# Patient Record
Sex: FEMALE | Race: WHITE | NOT HISPANIC OR LATINO | Employment: STUDENT | URBAN - METROPOLITAN AREA
[De-identification: names, ages, dates, MRNs, and addresses within clinical notes are randomized per-mention and may not be internally consistent; named-entity substitution may affect disease eponyms.]

---
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
## 2018-03-01 ENCOUNTER — CLINICAL SUPPORT (OUTPATIENT)
Dept: RETAIL CLINIC | Facility: CLINIC | Age: 18
End: 2018-03-01

## 2018-03-01 DIAGNOSIS — Z11.1 VISIT FOR TB SKIN TEST: Primary | ICD-10-CM

## 2018-03-01 LAB
INDURATION: 0 MM (ref 0–10)
TB SKIN TEST: NEGATIVE

## 2018-03-01 PROCEDURE — 86580 TB INTRADERMAL TEST: CPT | Performed by: NURSE PRACTITIONER

## 2018-04-17 ENCOUNTER — OFFICE VISIT (OUTPATIENT)
Dept: FAMILY MEDICINE CLINIC | Facility: CLINIC | Age: 18
End: 2018-04-17

## 2018-04-17 VITALS
WEIGHT: 209 LBS | RESPIRATION RATE: 16 BRPM | HEIGHT: 67 IN | TEMPERATURE: 97.9 F | HEART RATE: 81 BPM | OXYGEN SATURATION: 100 % | SYSTOLIC BLOOD PRESSURE: 110 MMHG | DIASTOLIC BLOOD PRESSURE: 70 MMHG | BODY MASS INDEX: 32.8 KG/M2

## 2018-04-17 DIAGNOSIS — R53.83 TIRED: Primary | ICD-10-CM

## 2018-04-17 DIAGNOSIS — Z83.2 FAMILY HISTORY OF IRON DEFICIENCY ANEMIA: ICD-10-CM

## 2018-04-17 PROCEDURE — 99213 OFFICE O/P EST LOW 20 MIN: CPT | Performed by: PHYSICIAN ASSISTANT

## 2018-04-17 RX ORDER — LORATADINE 10 MG/1
CAPSULE, LIQUID FILLED ORAL
COMMUNITY
End: 2021-02-19 | Stop reason: SDDI

## 2018-04-17 NOTE — PROGRESS NOTES
Subjective   Adriane Valentin is a 17 y.o. female.     History of Present Illness   Adriane Valentin 17 y.o. female who presents today for tired; no energy; had URI last month.  She is vegetarian.   she has a history of   Patient Active Problem List   Diagnosis   • Anxiety   • Depression   • Chronic nonintractable headache   • Weight gain   • Hyperprolactinemia   • Dizziness   • Chronic fatigue   .      Off OCP and regular periods    She sees DR Jean Coe for psych--on Lexapro and Adderall  Not falling asleep  Tired with minimal activity  Works at     If labs neg consider sleep study    Saw Dr Faye and prolactin f/u was normal  The following portions of the patient's history were reviewed and updated as appropriate: allergies, current medications, past family history, past medical history, past social history, past surgical history and problem list.    Review of Systems   Constitutional: Positive for fatigue and unexpected weight change. Negative for activity change and appetite change.   HENT: Negative for nosebleeds and trouble swallowing.    Eyes: Negative for pain and visual disturbance.   Respiratory: Negative for chest tightness, shortness of breath and wheezing.    Cardiovascular: Negative for chest pain and palpitations.   Gastrointestinal: Positive for nausea. Negative for abdominal pain and blood in stool.   Endocrine: Negative.    Genitourinary: Negative for difficulty urinating and hematuria.   Musculoskeletal: Negative for joint swelling.   Skin: Negative for color change and rash.   Allergic/Immunologic: Negative.    Neurological: Positive for dizziness. Negative for syncope and speech difficulty.   Hematological: Negative for adenopathy.   Psychiatric/Behavioral: Negative for agitation and confusion. The patient is nervous/anxious.    All other systems reviewed and are negative.      Objective   Physical Exam   Constitutional: She is oriented to person, place, and time. She appears well-developed and  well-nourished. No distress.   HENT:   Head: Normocephalic and atraumatic.   Right Ear: External ear normal.   Left Ear: External ear normal.   Nose: Nose normal.   Mouth/Throat: Oropharynx is clear and moist.   Mild cobblestoning   Eyes: Conjunctivae and EOM are normal. Pupils are equal, round, and reactive to light. Right eye exhibits no discharge. Left eye exhibits no discharge. No scleral icterus.   Neck: Normal range of motion. Neck supple. No tracheal deviation present. No thyromegaly present.   Cardiovascular: Normal rate, regular rhythm, normal heart sounds, intact distal pulses and normal pulses.  Exam reveals no gallop.    No murmur heard.  Pulmonary/Chest: Effort normal and breath sounds normal. No respiratory distress. She has no wheezes. She has no rales.   Abdominal: Soft. She exhibits no mass.   No organomeg   Musculoskeletal: Normal range of motion.   Neurological: She is alert and oriented to person, place, and time. She exhibits normal muscle tone. Coordination normal.   Skin: Skin is warm. No rash noted. No erythema. No pallor.   Psychiatric: She has a normal mood and affect. Her behavior is normal. Judgment and thought content normal.   Nursing note and vitals reviewed.      Assessment/Plan   Diagnoses and all orders for this visit:    Tired    Family history of iron deficiency anemia

## 2018-04-17 NOTE — PATIENT INSTRUCTIONS
Low glycemic index diet  Exercise 30 minutes most days of the week  Make sure you get results on any labs or tests we ordered today  We discussed medications and how to take them as prescribed  Sleep 6-8 hours each night if possible  If you have not signed up for PT PALt, please activate your code ASAP from your After Visit Summary today    LDL goal <100  LDL goal if heart disease <70  HDL goal >60  Triglyceride goal <150  BP goal =<130/80  Fasting glucose <100

## 2018-04-18 LAB
25(OH)D3+25(OH)D2 SERPL-MCNC: 42.8 NG/ML (ref 30–100)
ALBUMIN SERPL-MCNC: 4.8 G/DL (ref 3.5–5.2)
ALBUMIN/GLOB SERPL: 1.8 G/DL
ALP SERPL-CCNC: 94 U/L (ref 39–117)
ALT SERPL-CCNC: 11 U/L (ref 1–33)
AST SERPL-CCNC: 16 U/L (ref 1–32)
BASOPHILS # BLD AUTO: 0.08 10*3/MM3 (ref 0–0.2)
BASOPHILS NFR BLD AUTO: 0.7 % (ref 0–1.5)
BILIRUB SERPL-MCNC: 0.3 MG/DL (ref 0.1–1.2)
BUN SERPL-MCNC: 9 MG/DL (ref 6–20)
BUN/CREAT SERPL: 13.8 (ref 7–25)
CALCIUM SERPL-MCNC: 9.9 MG/DL (ref 8.6–10.5)
CHLORIDE SERPL-SCNC: 101 MMOL/L (ref 98–107)
CMV IGG SERPL IA-ACNC: <0.6 U/ML (ref 0–0.59)
CMV IGM SERPL IA-ACNC: <30 AU/ML (ref 0–29.9)
CO2 SERPL-SCNC: 26.9 MMOL/L (ref 22–29)
CREAT SERPL-MCNC: 0.65 MG/DL (ref 0.57–1)
EBV EA IGG SER-ACNC: <9 U/ML (ref 0–8.9)
EBV NA IGG SER IA-ACNC: <18 U/ML (ref 0–17.9)
EBV VCA IGG SER IA-ACNC: <18 U/ML (ref 0–17.9)
EBV VCA IGM SER IA-ACNC: <36 U/ML (ref 0–35.9)
EOSINOPHIL # BLD AUTO: 0.14 10*3/MM3 (ref 0–0.7)
EOSINOPHIL NFR BLD AUTO: 1.2 % (ref 0.3–6.2)
ERYTHROCYTE [DISTWIDTH] IN BLOOD BY AUTOMATED COUNT: 13 % (ref 11.7–13)
FERRITIN SERPL-MCNC: 28.85 NG/ML (ref 13–150)
FOLATE SERPL-MCNC: >20 NG/ML (ref 4.78–24.2)
GFR SERPLBLD CREATININE-BSD FMLA CKD-EPI: 117 ML/MIN/1.73
GFR SERPLBLD CREATININE-BSD FMLA CKD-EPI: 142 ML/MIN/1.73
GLOBULIN SER CALC-MCNC: 2.7 GM/DL
GLUCOSE SERPL-MCNC: 89 MG/DL (ref 65–99)
HCT VFR BLD AUTO: 42 % (ref 35.6–45.5)
HGB BLD-MCNC: 13.3 G/DL (ref 11.9–15.5)
IMM GRANULOCYTES # BLD: 0.04 10*3/MM3 (ref 0–0.03)
IMM GRANULOCYTES NFR BLD: 0.3 % (ref 0–0.5)
LYMPHOCYTES # BLD AUTO: 2.02 10*3/MM3 (ref 0.9–4.8)
LYMPHOCYTES NFR BLD AUTO: 16.7 % (ref 19.6–45.3)
MCH RBC QN AUTO: 28.7 PG (ref 26.9–32)
MCHC RBC AUTO-ENTMCNC: 31.7 G/DL (ref 32.4–36.3)
MCV RBC AUTO: 90.5 FL (ref 80.5–98.2)
MONOCYTES # BLD AUTO: 0.75 10*3/MM3 (ref 0.2–1.2)
MONOCYTES NFR BLD AUTO: 6.2 % (ref 5–12)
NEUTROPHILS # BLD AUTO: 9.08 10*3/MM3 (ref 1.9–8.1)
NEUTROPHILS NFR BLD AUTO: 74.9 % (ref 42.7–76)
PLATELET # BLD AUTO: 361 10*3/MM3 (ref 140–500)
POTASSIUM SERPL-SCNC: 4.2 MMOL/L (ref 3.5–5.2)
PROT SERPL-MCNC: 7.5 G/DL (ref 6–8.5)
RBC # BLD AUTO: 4.64 10*6/MM3 (ref 3.9–5.2)
SERVICE CMNT-IMP: NORMAL
SODIUM SERPL-SCNC: 142 MMOL/L (ref 136–145)
T3FREE SERPL-MCNC: 3.2 PG/ML (ref 2.3–5)
T4 FREE SERPL-MCNC: 1.21 NG/DL (ref 0.93–1.7)
TSH SERPL DL<=0.005 MIU/L-ACNC: 0.85 MIU/ML (ref 0.27–4.2)
VIT B12 SERPL-MCNC: 936 PG/ML (ref 211–946)
WBC # BLD AUTO: 12.11 10*3/MM3 (ref 4.5–10.7)

## 2018-04-19 DIAGNOSIS — R79.89 ABNORMAL CBC: Primary | ICD-10-CM

## 2018-04-19 RX ORDER — CEFDINIR 300 MG/1
CAPSULE ORAL
Qty: 20 CAPSULE | Refills: 0 | Status: SHIPPED | OUTPATIENT
Start: 2018-04-19 | End: 2018-04-20

## 2018-04-20 RX ORDER — AZITHROMYCIN 250 MG/1
TABLET, FILM COATED ORAL
Qty: 6 TABLET | Refills: 1 | Status: SHIPPED | OUTPATIENT
Start: 2018-04-20 | End: 2021-02-19 | Stop reason: SDDI

## 2019-06-05 ENCOUNTER — LAB (OUTPATIENT)
Dept: ENDOCRINOLOGY | Age: 19
End: 2019-06-05

## 2019-06-05 DIAGNOSIS — E22.1 HYPERPROLACTINEMIA (HCC): Primary | ICD-10-CM

## 2019-06-05 DIAGNOSIS — F32.A DEPRESSION, UNSPECIFIED DEPRESSION TYPE: ICD-10-CM

## 2019-06-05 DIAGNOSIS — F41.9 ANXIETY: ICD-10-CM

## 2019-06-05 DIAGNOSIS — R53.82 CHRONIC FATIGUE: ICD-10-CM

## 2019-06-05 DIAGNOSIS — R42 DIZZINESS: ICD-10-CM

## 2019-06-05 DIAGNOSIS — R63.5 WEIGHT GAIN: ICD-10-CM

## 2019-06-05 DIAGNOSIS — E22.1 HYPERPROLACTINEMIA (HCC): ICD-10-CM

## 2019-06-10 LAB
25(OH)D3+25(OH)D2 SERPL-MCNC: 35.3 NG/ML (ref 30–100)
ACTH PLAS-MCNC: 19.9 PG/ML (ref 7.2–63.3)
ALBUMIN SERPL-MCNC: 4.5 G/DL (ref 3.5–5.2)
ALBUMIN/GLOB SERPL: 1.6 G/DL
ALP SERPL-CCNC: 78 U/L (ref 39–117)
ALT SERPL-CCNC: 8 U/L (ref 1–33)
AST SERPL-CCNC: 12 U/L (ref 1–32)
BILIRUB SERPL-MCNC: 0.5 MG/DL (ref 0.2–1.2)
BUN SERPL-MCNC: 6 MG/DL (ref 6–20)
BUN/CREAT SERPL: 9.4 (ref 7–25)
CALCIUM SERPL-MCNC: 10 MG/DL (ref 8.6–10.5)
CHLORIDE SERPL-SCNC: 103 MMOL/L (ref 98–107)
CHOLEST SERPL-MCNC: 146 MG/DL (ref 0–200)
CO2 SERPL-SCNC: 26.2 MMOL/L (ref 22–29)
CORTIS SERPL-MCNC: 10.4 UG/DL
CREAT SERPL-MCNC: 0.64 MG/DL (ref 0.57–1)
FT4I SERPL CALC-MCNC: 1.7 (ref 1.2–4.9)
GLOBULIN SER CALC-MCNC: 2.8 GM/DL
GLUCOSE SERPL-MCNC: 92 MG/DL (ref 65–99)
HDLC SERPL-MCNC: 56 MG/DL (ref 40–60)
INTERPRETATION: NORMAL
LDLC SERPL CALC-MCNC: 83 MG/DL (ref 0–100)
POTASSIUM SERPL-SCNC: 4.2 MMOL/L (ref 3.5–5.2)
PROLACTIN SERPL-MCNC: 25.2 NG/ML (ref 4.8–23.3)
PROT SERPL-MCNC: 7.3 G/DL (ref 6–8.5)
SODIUM SERPL-SCNC: 141 MMOL/L (ref 136–145)
T3FREE SERPL-MCNC: 3.1 PG/ML (ref 2–4.4)
T3RU NFR SERPL: 25 % (ref 24–39)
T4 FREE SERPL-MCNC: 1.2 NG/DL (ref 0.93–1.7)
T4 SERPL-MCNC: 6.7 UG/DL (ref 4.5–12)
THYROGLOB AB SERPL-ACNC: 1.2 IU/ML (ref 0–0.9)
THYROGLOB SERPL-MCNC: 4.4 NG/ML
TRIGL SERPL-MCNC: 37 MG/DL (ref 0–150)
TSH SERPL DL<=0.005 MIU/L-ACNC: 1.6 UIU/ML (ref 0.45–4.5)
URATE SERPL-MCNC: 4.7 MG/DL (ref 2.4–5.7)
VLDLC SERPL CALC-MCNC: 7.4 MG/DL

## 2019-06-14 ENCOUNTER — OFFICE VISIT (OUTPATIENT)
Dept: ENDOCRINOLOGY | Age: 19
End: 2019-06-14

## 2019-06-14 VITALS
HEIGHT: 67 IN | DIASTOLIC BLOOD PRESSURE: 68 MMHG | BODY MASS INDEX: 30.61 KG/M2 | SYSTOLIC BLOOD PRESSURE: 124 MMHG | WEIGHT: 195 LBS

## 2019-06-14 DIAGNOSIS — R63.5 WEIGHT GAIN: Primary | ICD-10-CM

## 2019-06-14 DIAGNOSIS — N92.6 ABNORMAL MENSES: ICD-10-CM

## 2019-06-14 DIAGNOSIS — F32.A DEPRESSION, UNSPECIFIED DEPRESSION TYPE: ICD-10-CM

## 2019-06-14 DIAGNOSIS — R53.82 CHRONIC FATIGUE: ICD-10-CM

## 2019-06-14 PROCEDURE — 99214 OFFICE O/P EST MOD 30 MIN: CPT | Performed by: NURSE PRACTITIONER

## 2019-06-14 NOTE — PROGRESS NOTES
Subjective   Adriane Valentin is a 18 y.o. female here to be seen for hyperprolactinemia.     Hyperprolactinemia, fatigue, irrg menses,       Hyperprolactinemia   This is a chronic problem. The current episode started more than 1 year ago. The problem occurs constantly. The problem has been unchanged. Associated symptoms include fatigue and headaches. Pertinent negatives include no coughing, myalgias or rash. Nothing aggravates the symptoms. She has tried nothing for the symptoms. The treatment provided no relief.   Fatigue   This is a chronic problem. The current episode started more than 1 year ago. The problem occurs constantly. The problem has been unchanged. Associated symptoms include fatigue and headaches. Pertinent negatives include no coughing, myalgias or rash. The treatment provided no relief.       The following portions of the patient's history were reviewed and updated as appropriate: current medications, past family history, past medical history, past social history, past surgical history and problem list.       Current Outpatient Medications:   •  azithromycin (ZITHROMAX) 250 MG tablet, Take 2 tablets the first day, then 1 tablet daily for 4 days for infection, Disp: 6 tablet, Rfl: 1  •  clonazePAM (KlonoPIN) 0.5 MG tablet, Take 0.5 mg by mouth 2 (two) times a day as needed., Disp: , Rfl:   •  escitalopram (LEXAPRO) 10 MG tablet, Take 5 mg by mouth Daily., Disp: , Rfl:   •  hyoscyamine (ANASPAZ,LEVSIN) 0.125 MG tablet, One PO 20 minutes prior to meals or Q 6 PRN IBS, Disp: 60 tablet, Rfl: 2  •  Loratadine 10 MG capsule, Take  by mouth., Disp: , Rfl:   •  methylphenidate (RITALIN) 10 MG tablet, 2 (Two) Times a Day As Needed., Disp: , Rfl:   •  Ped Multivitamins-Fl-Iron (MULTIVITAMIN WITH FLUORIDE/IRON) 0.25-10 MG/ML solution solution, Take  by mouth Daily., Disp: , Rfl:   •  Norethindrone Acet-Ethinyl Est (LOESTRIN 1/20, 21, PO), Take 1 tablet by mouth Daily., Disp: , Rfl:      Patient Active Problem List    Diagnosis   • Anxiety   • Depression   • Chronic nonintractable headache   • Weight gain   • Hyperprolactinemia (CMS/HCC)   • Dizziness   • Chronic fatigue       Review of Systems   Constitutional: Positive for fatigue.   HENT: Negative for trouble swallowing.    Eyes: Positive for visual disturbance.   Respiratory: Negative for cough.    Cardiovascular: Negative for palpitations.   Gastrointestinal: Positive for constipation and diarrhea.   Endocrine: Negative for cold intolerance.   Genitourinary: Positive for menstrual problem.        Menses - OCP made them every 2 weeks, heavy - painful- now irreg within 2 days heavy for long duration   Musculoskeletal: Negative for myalgias.   Skin: Negative for rash.   Allergic/Immunologic: Negative.    Neurological: Positive for light-headedness and headaches.   Hematological: Bruises/bleeds easily.   Psychiatric/Behavioral: Positive for sleep disturbance. The patient is nervous/anxious.         Anxious all the time, sorse around menses.   Sleep - difficult going to sleep and staying asleep     Lab on 06/05/2019   Component Date Value Ref Range Status   • Uric Acid 06/05/2019 4.7  2.4 - 5.7 mg/dL Final   • Thyroglobulin Ab 06/05/2019 1.2* 0.0 - 0.9 IU/mL Final    Thyroglobulin Antibody measured by [facility] Methodology   • Thyroglobulin (TG-JUANJOSE) 06/05/2019 4.4  ng/mL Final    Comment: Reference Range:  Pubertal Children  and Adults: <40  According to the National Academy of Clinical Biochemistry,  the reference interval for Thyroglobulin (TG) should be  related to euthyroid patients and not for patients who  underwent thyroidectomy.  TG reference intervals for these  patients depend on the residual mass of the thyroid tissue  left after surgery.  Establishing a post-operative baseline  is recommended.  The assay quantitation limit is 2.0 ng/mL.     • Prolactin 06/05/2019 25.2* 4.8 - 23.3 ng/mL Final   • ACTH 06/05/2019 19.9  7.2 - 63.3 pg/mL Final    ACTH reference  interval for samples collected between 7 and 10 AM.   • Cortisol 06/05/2019 10.4  ug/dL Final    Comment:                         Cortisol AM         6.2 - 19.4                          Cortisol PM         2.3 - 11.9     • Total Cholesterol 06/05/2019 146  0 - 200 mg/dL Final   • Triglycerides 06/05/2019 37  0 - 150 mg/dL Final   • HDL Cholesterol 06/05/2019 56  40 - 60 mg/dL Final   • VLDL Cholesterol 06/05/2019 7.4  mg/dL Final   • LDL Cholesterol  06/05/2019 83  0 - 100 mg/dL Final   • TSH 06/05/2019 1.600  0.450 - 4.500 uIU/mL Final   • T4, Total 06/05/2019 6.7  4.5 - 12.0 ug/dL Final   • T3 Uptake 06/05/2019 25  24 - 39 % Final   • Free Thyroxine Index 06/05/2019 1.7  1.2 - 4.9 Final   • T3, Free 06/05/2019 3.1  2.0 - 4.4 pg/mL Final   • Free T4 06/05/2019 1.20  0.93 - 1.70 ng/dL Final   • 25 Hydroxy, Vitamin D 06/05/2019 35.3  30.0 - 100.0 ng/ml Final    Comment: Reference Range for Total Vitamin D 25(OH)  Deficiency <20.0 ng/mL  Insufficiency 21-29 ng/mL  Sufficiency  ng/mL  Toxicity >100 ng/ml     • Glucose 06/05/2019 92  65 - 99 mg/dL Final   • BUN 06/05/2019 6  6 - 20 mg/dL Final   • Creatinine 06/05/2019 0.64  0.57 - 1.00 mg/dL Final   • eGFR Non  Am 06/05/2019 118  >60 mL/min/1.73 Final   • eGFR African Am 06/05/2019 143  >60 mL/min/1.73 Final   • BUN/Creatinine Ratio 06/05/2019 9.4  7.0 - 25.0 Final   • Sodium 06/05/2019 141  136 - 145 mmol/L Final   • Potassium 06/05/2019 4.2  3.5 - 5.2 mmol/L Final   • Chloride 06/05/2019 103  98 - 107 mmol/L Final   • Total CO2 06/05/2019 26.2  22.0 - 29.0 mmol/L Final   • Calcium 06/05/2019 10.0  8.6 - 10.5 mg/dL Final   • Total Protein 06/05/2019 7.3  6.0 - 8.5 g/dL Final   • Albumin 06/05/2019 4.50  3.50 - 5.20 g/dL Final   • Globulin 06/05/2019 2.8  gm/dL Final   • A/G Ratio 06/05/2019 1.6  g/dL Final   • Total Bilirubin 06/05/2019 0.5  0.2 - 1.2 mg/dL Final   • Alkaline Phosphatase 06/05/2019 78  39 - 117 U/L Final   • AST (SGOT) 06/05/2019 12  1 -  32 U/L Final   • ALT (SGPT) 06/05/2019 8  1 - 33 U/L Final   • Interpretation 06/05/2019 Note   Final    Supplemental report is available.       Wt Readings from Last 3 Encounters:   06/14/19 88.5 kg (195 lb)   04/17/18 94.8 kg (209 lb) (98 %, Z= 2.07)*   09/22/17 91.2 kg (201 lb) (98 %, Z= 1.97)*     * Growth percentiles are based on [facility] (Girls, 2-20 Years) data.     Temp Readings from Last 3 Encounters:   04/17/18 97.9 °F (36.6 °C) (Oral)   09/21/17 98.4 °F (36.9 °C) (Oral)   08/07/17 98.7 °F (37.1 °C) (Oral)     BP Readings from Last 3 Encounters:   06/14/19 124/68   04/17/18 110/70   09/22/17 110/78     Pulse Readings from Last 3 Encounters:   04/17/18 81   09/21/17 87   08/07/17 90       Objective   Physical Exam   Constitutional: She is oriented to person, place, and time. She appears well-developed and well-nourished. No distress.   HENT:   Head: Normocephalic and atraumatic.   Eyes: EOM are normal. Pupils are equal, round, and reactive to light.   Neck: Normal range of motion. Neck supple.   Cardiovascular: Normal rate, regular rhythm, normal heart sounds and intact distal pulses.   No murmur heard.  Pulmonary/Chest: Effort normal and breath sounds normal.   Abdominal: Soft. Bowel sounds are normal.   Musculoskeletal: Normal range of motion.   Neurological: She is alert and oriented to person, place, and time.   Skin: Skin is warm and dry. Capillary refill takes 2 to 3 seconds. Rash noted. She is not diaphoretic. No pallor.   Skin bilateral axilla and posterior neck   Psychiatric: She has a normal mood and affect. Her behavior is normal. Judgment and thought content normal.   Nursing note and vitals reviewed.        Assessment/Plan   Adriane was seen today for follow-up.     in summary:    Diagnoses and all orders for this visit:    Weight gain  -     Cortisol  -     ACTH  -     Insulin, Total  -     C-Peptide  -     Uric Acid    Chronic fatigue  -     Cortisol  -     ACTH  -     Insulin, Total  -      C-Peptide  -     Uric Acid    Depression, unspecified depression type  -     Cortisol  -     ACTH  -     Insulin, Total  -     C-Peptide  -     Uric Acid    Abnormal menses  -     Cortisol        Diagnoses and all orders for this visit:    Weight gain- chronic, uncontrolled.  Medication changes were as follows  No medication changes at this time. Will assess additional lab work.      Chronic fatigue- No medication changes at this time. Will assess additional lab work.         Depression, unspecified depression type- chronic, uncontrolled.  Medication changes were as follows  None at this time.  Discussion concerning genetic testing due to multiple medications patients have's been on.  Patient took kit with her and will decide with her mother.     Abnormal menses -chronic, uncontrolled.  Medication changes were as follows  no changes at this time.  Long discussion concerning progesterone only med occasions due to the sensitivity of estrogen.      No Follow-up on file.

## 2019-06-17 LAB
ACTH PLAS-MCNC: 11.3 PG/ML (ref 7.2–63.3)
C PEPTIDE SERPL-MCNC: 3.1 NG/ML (ref 1.1–4.4)
CORTIS SERPL-MCNC: 12.7 UG/DL
INSULIN SERPL-ACNC: 11.9 UIU/ML (ref 2.6–24.9)
URATE SERPL-MCNC: 4.6 MG/DL (ref 2.4–5.7)

## 2021-02-19 ENCOUNTER — OFFICE VISIT (OUTPATIENT)
Dept: FAMILY MEDICINE CLINIC | Facility: CLINIC | Age: 21
End: 2021-02-19

## 2021-02-19 VITALS
HEART RATE: 82 BPM | WEIGHT: 171.8 LBS | RESPIRATION RATE: 16 BRPM | TEMPERATURE: 97.5 F | BODY MASS INDEX: 26.97 KG/M2 | DIASTOLIC BLOOD PRESSURE: 61 MMHG | HEIGHT: 67 IN | SYSTOLIC BLOOD PRESSURE: 100 MMHG | OXYGEN SATURATION: 99 %

## 2021-02-19 DIAGNOSIS — E22.1 HYPERPROLACTINEMIA (HCC): ICD-10-CM

## 2021-02-19 DIAGNOSIS — N76.0 BV (BACTERIAL VAGINOSIS): Primary | ICD-10-CM

## 2021-02-19 DIAGNOSIS — E61.1 LOW IRON: ICD-10-CM

## 2021-02-19 DIAGNOSIS — R53.82 CHRONIC FATIGUE: ICD-10-CM

## 2021-02-19 DIAGNOSIS — B96.89 BV (BACTERIAL VAGINOSIS): Primary | ICD-10-CM

## 2021-02-19 DIAGNOSIS — R79.89 PROLACTIN INCREASED: ICD-10-CM

## 2021-02-19 DIAGNOSIS — Z00.00 LABORATORY EXAMINATION ORDERED AS PART OF A ROUTINE GENERAL MEDICAL EXAMINATION: ICD-10-CM

## 2021-02-19 DIAGNOSIS — E55.9 VITAMIN D DEFICIENCY: ICD-10-CM

## 2021-02-19 PROCEDURE — 99213 OFFICE O/P EST LOW 20 MIN: CPT | Performed by: PHYSICIAN ASSISTANT

## 2021-02-19 RX ORDER — FLUTICASONE PROPIONATE 50 MCG
SPRAY, SUSPENSION (ML) NASAL
Qty: 48 G | Refills: 3 | Status: SHIPPED | OUTPATIENT
Start: 2021-02-19 | End: 2021-06-17

## 2021-02-19 RX ORDER — METRONIDAZOLE 7.5 MG/G
GEL VAGINAL
Qty: 70 G | Refills: 0 | Status: SHIPPED | OUTPATIENT
Start: 2021-02-19 | End: 2021-06-17

## 2021-02-19 NOTE — PATIENT INSTRUCTIONS
Allergies, Adult  An allergy is when your body's defense system (immune system) overreacts to an otherwise harmless substance (allergen) that you breathe in or eat or something that touches your skin. When you come into contact with something that you are allergic to, your immune system produces certain proteins (antibodies). These proteins cause cells to release chemicals (histamines) that trigger the symptoms of an allergic reaction.  Allergies often affect the nasal passages (allergic rhinitis), eyes (allergic conjunctivitis), skin (atopic dermatitis), and stomach. Allergies can be mild or severe. Allergies cannot spread from person to person (are not contagious). They can develop at any age and may be outgrown.  What increases the risk?  You may be at greater risk of allergies if other people in your family have allergies.  What are the signs or symptoms?  Symptoms depend on what type of allergy you have. They may include:  · Runny, stuffy nose.  · Sneezing.  · Itchy mouth, ears, or throat.  · Postnasal drip.  · Sore throat.  · Itchy, red, watery, or puffy eyes.  · Skin rash or hives.  · Stomach pain.  · Vomiting.  · Diarrhea.  · Bloating.  · Wheezing or coughing.  People with a severe allergy to food, medicine, or an insect bite may have a life-threatening allergic reaction (anaphylaxis). Symptoms of anaphylaxis include:  · Hives.  · Itching.  · Flushed face.  · Swollen lips, tongue, or mouth.  · Tight or swollen throat.  · Chest pain or tightness in the chest.  · Trouble breathing or shortness of breath.  · Rapid heartbeat.  · Dizziness or fainting.  · Vomiting.  · Diarrhea.  · Pain in the abdomen.  How is this diagnosed?  This condition is diagnosed based on:  · Your symptoms.  · Your family and medical history.  · A physical exam.  You may need to see a health care provider who specializes in treating allergies (allergist). You may also have tests, including:  · Skin tests to see which allergens are causing  your symptoms, such as:  ? Skin prick test. In this test, your skin is pricked with a tiny needle and exposed to small amounts of possible allergens to see if your skin reacts.  ? Intradermal skin test. In this test, a small amount of allergen is injected under your skin to see if your skin reacts.  ? Patch test. In this test, a small amount of allergen is placed on your skin and then your skin is covered with a bandage. Your health care provider will check your skin after a couple of days to see if a rash has developed.  · Blood tests.  · Challenges tests. In this test, you inhale a small amount of allergen by mouth to see if you have an allergic reaction.  You may also be asked to:  · Keep a food diary. A food diary is a record of all the foods and drinks you have in a day and any symptoms you experience.  · Practice an elimination diet. An elimination diet involves eliminating specific foods from your diet and then adding them back in one by one to find out if a certain food causes an allergic reaction.  How is this treated?  Treatment for allergies depends on your symptoms. Treatment may include:  · Cold compresses to soothe itching and swelling.  · Eye drops.  · Nasal sprays.  · Using a saline spray or container (neti pot) to flush out the nose (nasal irrigation). These methods can help clear away mucus and keep the nasal passages moist.  · Using a humidifier.  · Oral antihistamines or other medicines to block allergic reaction and inflammation.  · Skin creams to treat rashes or itching.  · Diet changes to eliminate food allergy triggers.  · Repeated exposure to tiny amounts of allergens to build up a tolerance and prevent future allergic reactions (immunotherapy). These include:  ? Allergy shots.  ? Oral treatment. This involves taking small doses of an allergen under the tongue (sublingual immunotherapy).  · Emergency epinephrine injection (auto-injector) in case of an allergic emergency. This is a  self-injectable, pre-measured medicine that must be given within the first few minutes of a serious allergic reaction.  Follow these instructions at home:              · Avoid known allergens whenever possible.  · If you suffer from airborne allergens, wash out your nose daily. You can do this with a saline spray or a neti pot to flush out your nose (nasal irrigation).  · Take over-the-counter and prescription medicines only as told by your health care provider.  · Keep all follow-up visits as told by your health care provider. This is important.  · If you are at risk of a severe allergic reaction (anaphylaxis), keep your auto-injector with you at all times.  · If you have ever had anaphylaxis, wear a medical alert bracelet or necklace that states you have a severe allergy.  Contact a health care provider if:  · Your symptoms do not improve with treatment.  Get help right away if:  · You have symptoms of anaphylaxis, such as:  ? Swollen mouth, tongue, or throat.  ? Pain or tightness in your chest.  ? Trouble breathing or shortness of breath.  ? Dizziness or fainting.  ? Severe abdominal pain, vomiting, or diarrhea.  This information is not intended to replace advice given to you by your health care provider. Make sure you discuss any questions you have with your health care provider.  Document Revised: 03/13/2019 Document Reviewed: 07/05/2017  Elsevier Patient Education © 2020 [employer]

## 2021-02-19 NOTE — PROGRESS NOTES
"Subjective   Adriane Valentin is a 20 y.o. female.     History of Present Illness   Adriane Valentin 20 y.o. female /61 (BP Location: Right arm, Patient Position: Sitting, Cuff Size: Adult)   Pulse 82   Temp 97.5 °F (36.4 °C)   Resp 16   Ht 170.2 cm (67.01\")   Wt 77.9 kg (171 lb 12.8 oz)   LMP 02/19/2021   SpO2 99%   BMI 26.90 kg/m²  who presents today for tired. Want to update; ears pop at times----has allergy r/t pnd---will also get reactive nodes in neck;   she has a history of   Patient Active Problem List   Diagnosis   • Anxiety   • Depression   • Chronic nonintractable headache   • Weight gain   • Hyperprolactinemia (CMS/HCC)   • Dizziness   • Chronic fatigue   .          Rash with Clindamycin    Need to f/u on prolactin;  Saw endocrine;  Missed cycle last mo; start OCP Sept----update labs; does not eat red meat.  ? Fatigue from anemia  No nipple d/c  Some exercise  Tired.  Covid Dec  Not on Rx antibiotic--stopped Cleocin d/t rash and GI upset; will send Metrogel.   Dr Jean Coe is psych  Had BV Dx at GYN    Mom DMI and thyroid    The following portions of the patient's history were reviewed and updated as appropriate: allergies, current medications, past family history, past medical history, past social history, past surgical history and problem list.    Review of Systems   Constitutional: Positive for fatigue. Negative for fever.   HENT: Positive for postnasal drip. Negative for nosebleeds and trouble swallowing.    Eyes: Negative for visual disturbance.   Respiratory: Negative for choking and stridor.    Cardiovascular: Negative for chest pain.   Gastrointestinal: Negative for blood in stool.   Endocrine: Negative for polydipsia.   Genitourinary: Negative for genital sores and hematuria.   Musculoskeletal: Negative for joint swelling.   Skin: Negative for color change and rash.   Allergic/Immunologic: Negative for immunocompromised state.   Neurological: Negative for seizures, facial asymmetry and " speech difficulty.   Hematological: Negative for adenopathy.   Psychiatric/Behavioral: Negative for behavioral problems, self-injury and suicidal ideas.       Objective   Physical Exam  Vitals signs and nursing note reviewed.   Constitutional:       General: She is not in acute distress.     Appearance: She is well-developed. She is not ill-appearing or toxic-appearing.   HENT:      Head: Normocephalic.      Comments: Ear fluid bilat     Right Ear: Ear canal and external ear normal.      Left Ear: Ear canal and external ear normal.      Nose: Nose normal.      Mouth/Throat:      Pharynx: Oropharynx is clear. Posterior oropharyngeal erythema present.      Comments: pnd  Eyes:      General: No scleral icterus.     Conjunctiva/sclera: Conjunctivae normal.      Pupils: Pupils are equal, round, and reactive to light.   Neck:      Musculoskeletal: Normal range of motion and neck supple.      Thyroid: No thyromegaly.      Vascular: No carotid bruit.   Cardiovascular:      Rate and Rhythm: Normal rate and regular rhythm.      Pulses: Normal pulses.      Heart sounds: Normal heart sounds. No murmur.   Pulmonary:      Effort: Pulmonary effort is normal. No respiratory distress.      Breath sounds: Normal breath sounds.   Abdominal:      General: Abdomen is flat. Bowel sounds are normal.      Palpations: There is no mass.      Comments: No HSM   Musculoskeletal: Normal range of motion.         General: No deformity.      Right lower leg: No edema.      Left lower leg: No edema.   Lymphadenopathy:      Cervical: Cervical adenopathy (anterior cervical shotty nodes; not tender) present.   Skin:     General: Skin is warm and dry.      Coloration: Skin is not jaundiced.      Findings: No rash.   Neurological:      General: No focal deficit present.      Mental Status: She is alert and oriented to person, place, and time. Mental status is at baseline.      Motor: No weakness.      Coordination: Coordination normal.   Psychiatric:          Mood and Affect: Mood normal.         Behavior: Behavior normal.         Thought Content: Thought content normal.         Judgment: Judgment normal.         Assessment/Plan   Diagnoses and all orders for this visit:    1. BV (bacterial vaginosis) (Primary)  -     Comprehensive metabolic panel  -     Lipid panel  -     CBC and Differential  -     TSH  -     T4, Free  -     T3, Free  -     Vitamin B12  -     Folate  -     Vitamin D 25 Hydroxy  -     Prolactin  -     Ferritin  -     Iron Profile    2. Hyperprolactinemia (CMS/HCC)  -     Comprehensive metabolic panel  -     Lipid panel  -     CBC and Differential  -     TSH  -     T4, Free  -     T3, Free  -     Vitamin B12  -     Folate  -     Vitamin D 25 Hydroxy  -     Prolactin  -     Ferritin  -     Iron Profile    3. Chronic fatigue  -     Comprehensive metabolic panel  -     Lipid panel  -     CBC and Differential  -     TSH  -     T4, Free  -     T3, Free  -     Vitamin B12  -     Folate  -     Vitamin D 25 Hydroxy  -     Prolactin  -     Ferritin  -     Iron Profile    4. Vitamin D deficiency  -     Comprehensive metabolic panel  -     Lipid panel  -     CBC and Differential  -     TSH  -     T4, Free  -     T3, Free  -     Vitamin B12  -     Folate  -     Vitamin D 25 Hydroxy  -     Prolactin  -     Ferritin  -     Iron Profile    5. Laboratory examination ordered as part of a routine general medical examination  -     Comprehensive metabolic panel  -     Lipid panel  -     CBC and Differential  -     TSH  -     T4, Free  -     T3, Free  -     Vitamin B12  -     Folate  -     Vitamin D 25 Hydroxy  -     Prolactin  -     Ferritin  -     Iron Profile    Other orders  -     metroNIDAZOLE (METROGEL VAGINAL) 0.75 % vaginal gel; Insert  into the vagina every night at bedtime. X 5 days  Dispense: 70 g; Refill: 0  -     fluticasone (FLONASE) 50 MCG/ACT nasal spray; Administer 2 sprays in each nostril for each dose once daily for allergies  Dispense: 48 g; Refill:  3        Change to Metrogel  Update labs  Add Flonase  ? Anemia  Watch cervical nodes--shotty; r/t drg; can see ENT if increase and check CBC         Answers for HPI/ROS submitted by the patient on 2/17/2021   What is the primary reason for your visit?: Other  Please describe your symptoms.: fatigue  Have you had these symptoms before?: Yes  How long have you been having these symptoms?: Greater than 2 weeks  Please describe any probable cause for these symptoms. : low iron, birth control, vitamin def

## 2021-02-24 LAB
25(OH)D3+25(OH)D2 SERPL-MCNC: 35.6 NG/ML (ref 30–100)
ALBUMIN SERPL-MCNC: 4.3 G/DL (ref 3.5–5.2)
ALBUMIN/GLOB SERPL: 1.7 G/DL
ALP SERPL-CCNC: 57 U/L (ref 39–117)
ALT SERPL-CCNC: 9 U/L (ref 1–33)
AST SERPL-CCNC: 16 U/L (ref 1–32)
BASOPHILS # BLD AUTO: 0.07 10*3/MM3 (ref 0–0.2)
BASOPHILS NFR BLD AUTO: 1 % (ref 0–1.5)
BILIRUB SERPL-MCNC: 0.3 MG/DL (ref 0–1.2)
BUN SERPL-MCNC: 6 MG/DL (ref 6–20)
BUN/CREAT SERPL: 9.5 (ref 7–25)
CALCIUM SERPL-MCNC: 9.4 MG/DL (ref 8.6–10.5)
CHLORIDE SERPL-SCNC: 104 MMOL/L (ref 98–107)
CHOLEST SERPL-MCNC: 161 MG/DL (ref 0–200)
CO2 SERPL-SCNC: 27.4 MMOL/L (ref 22–29)
CREAT SERPL-MCNC: 0.63 MG/DL (ref 0.57–1)
EOSINOPHIL # BLD AUTO: 0.12 10*3/MM3 (ref 0–0.4)
EOSINOPHIL NFR BLD AUTO: 1.8 % (ref 0.3–6.2)
ERYTHROCYTE [DISTWIDTH] IN BLOOD BY AUTOMATED COUNT: 13.4 % (ref 12.3–15.4)
FERRITIN SERPL-MCNC: 10.2 NG/ML (ref 13–150)
FOLATE SERPL-MCNC: 5.19 NG/ML (ref 4.78–24.2)
GLOBULIN SER CALC-MCNC: 2.6 GM/DL
GLUCOSE SERPL-MCNC: 96 MG/DL (ref 65–99)
HCT VFR BLD AUTO: 39.1 % (ref 34–46.6)
HDLC SERPL-MCNC: 58 MG/DL (ref 40–60)
HGB BLD-MCNC: 13.6 G/DL (ref 12–15.9)
IMM GRANULOCYTES # BLD AUTO: 0.02 10*3/MM3 (ref 0–0.05)
IMM GRANULOCYTES NFR BLD AUTO: 0.3 % (ref 0–0.5)
IRON SATN MFR SERPL: 11 % (ref 20–50)
IRON SERPL-MCNC: 53 MCG/DL (ref 37–145)
LDLC SERPL CALC-MCNC: 91 MG/DL (ref 0–100)
LYMPHOCYTES # BLD AUTO: 1.99 10*3/MM3 (ref 0.7–3.1)
LYMPHOCYTES NFR BLD AUTO: 29.5 % (ref 19.6–45.3)
MCH RBC QN AUTO: 28.2 PG (ref 26.6–33)
MCHC RBC AUTO-ENTMCNC: 34.8 G/DL (ref 31.5–35.7)
MCV RBC AUTO: 81 FL (ref 79–97)
MONOCYTES # BLD AUTO: 0.55 10*3/MM3 (ref 0.1–0.9)
MONOCYTES NFR BLD AUTO: 8.2 % (ref 5–12)
NEUTROPHILS # BLD AUTO: 3.99 10*3/MM3 (ref 1.7–7)
NEUTROPHILS NFR BLD AUTO: 59.2 % (ref 42.7–76)
NRBC BLD AUTO-RTO: 0 /100 WBC (ref 0–0.2)
PLATELET # BLD AUTO: 289 10*3/MM3 (ref 140–450)
POTASSIUM SERPL-SCNC: 4.4 MMOL/L (ref 3.5–5.2)
PROLACTIN SERPL-MCNC: 38.7 NG/ML (ref 4.8–23.3)
PROT SERPL-MCNC: 6.9 G/DL (ref 6–8.5)
RBC # BLD AUTO: 4.83 10*6/MM3 (ref 3.77–5.28)
SARS-COV-2 AB SERPL QL IA: POSITIVE
SODIUM SERPL-SCNC: 139 MMOL/L (ref 136–145)
T3FREE SERPL-MCNC: 3.3 PG/ML (ref 2–4.4)
T4 FREE SERPL-MCNC: 1.3 NG/DL (ref 0.93–1.7)
TIBC SERPL-MCNC: 468 MCG/DL
TRIGL SERPL-MCNC: 62 MG/DL (ref 0–150)
TSH SERPL DL<=0.005 MIU/L-ACNC: 2.09 UIU/ML (ref 0.27–4.2)
UIBC SERPL-MCNC: 415 MCG/DL (ref 112–346)
VIT B12 SERPL-MCNC: 428 PG/ML (ref 211–946)
VLDLC SERPL CALC-MCNC: 12 MG/DL (ref 5–40)
WBC # BLD AUTO: 6.74 10*3/MM3 (ref 3.4–10.8)

## 2021-02-24 RX ORDER — FERROUS SULFATE 325(65) MG
TABLET ORAL
Qty: 90 TABLET | Refills: 1 | Status: SHIPPED | OUTPATIENT
Start: 2021-02-24 | End: 2022-12-06

## 2021-02-24 RX ORDER — MULTIPLE VITAMINS W/ MINERALS TAB 9MG-400MCG
1 TAB ORAL DAILY
Qty: 90 TABLET | Refills: 3 | Status: SHIPPED | OUTPATIENT
Start: 2021-02-24

## 2021-04-16 ENCOUNTER — BULK ORDERING (OUTPATIENT)
Dept: CASE MANAGEMENT | Facility: OTHER | Age: 21
End: 2021-04-16

## 2021-04-16 DIAGNOSIS — Z23 IMMUNIZATION DUE: ICD-10-CM

## 2021-06-17 ENCOUNTER — OFFICE VISIT (OUTPATIENT)
Dept: OBSTETRICS AND GYNECOLOGY | Age: 21
End: 2021-06-17

## 2021-06-17 VITALS
BODY MASS INDEX: 26.68 KG/M2 | WEIGHT: 170 LBS | SYSTOLIC BLOOD PRESSURE: 116 MMHG | DIASTOLIC BLOOD PRESSURE: 70 MMHG | HEIGHT: 67 IN

## 2021-06-17 DIAGNOSIS — Z87.42 H/O ABNORMAL CERVICAL PAPANICOLAOU SMEAR: ICD-10-CM

## 2021-06-17 DIAGNOSIS — Z30.09 CONTRACEPTIVE EDUCATION: ICD-10-CM

## 2021-06-17 DIAGNOSIS — R10.2 PELVIC PAIN: ICD-10-CM

## 2021-06-17 DIAGNOSIS — N92.6 IRREGULAR BLEEDING: Primary | ICD-10-CM

## 2021-06-17 DIAGNOSIS — Z11.3 SCREEN FOR STD (SEXUALLY TRANSMITTED DISEASE): ICD-10-CM

## 2021-06-17 LAB
BILIRUB BLD-MCNC: NEGATIVE MG/DL
CLARITY, POC: CLEAR
COLOR UR: YELLOW
GLUCOSE UR STRIP-MCNC: NEGATIVE MG/DL
KETONES UR QL: NEGATIVE
LEUKOCYTE EST, POC: NEGATIVE
NITRITE UR-MCNC: NEGATIVE MG/ML
PH UR: 6.5 [PH] (ref 5–8)
PROT UR STRIP-MCNC: NEGATIVE MG/DL
RBC # UR STRIP: ABNORMAL /UL
SP GR UR: 1.01 (ref 1–1.03)
UROBILINOGEN UR QL: NORMAL

## 2021-06-17 PROCEDURE — 81002 URINALYSIS NONAUTO W/O SCOPE: CPT | Performed by: PHYSICIAN ASSISTANT

## 2021-06-17 PROCEDURE — 99213 OFFICE O/P EST LOW 20 MIN: CPT | Performed by: PHYSICIAN ASSISTANT

## 2021-06-17 RX ORDER — NORETHINDRONE ACETATE AND ETHINYL ESTRADIOL 1MG-20(21)
KIT ORAL
COMMUNITY
Start: 2021-05-07 | End: 2021-08-09 | Stop reason: SDUPTHER

## 2021-06-17 NOTE — PROGRESS NOTES
"Subjective     Chief Complaint   Patient presents with   • Gynecologic Exam     new pt annual exam c/o had bv in january did not take the medication, now she is having abdominal pain, irregular bleeding.       History of Present Illness    Adriane Valentin is a 20 y.o. No obstetric history on file. who presents for annual exam.  Her menses are {Frequencies; period menses:13862::\"regular every 28-30 days\"}, lasting {PI menses duration:21335::\"4-7 days\"}, dysmenorrhea {Desc; dysmenorrhea:716}   Obstetric History:  OB History    No obstetric history on file.        Menstrual History:     No LMP recorded.         Current contraception: {contraceptive method:5051}  History of abnormal Pap smear: {yes***/no:43204}  Received Gardasil immunization: {YES NO:35357}  Perform regular self breast exam: {yes**/no:85412}  Family history of uterine or ovarian cancer: {yes***/no:41182}  Family History of colon cancer: {yes**/no:42585}  Family history of breast cancer: {yes***/no:30037}    Mammogram: {[facility] mammo/dexa:95648}.  Colonoscopy: {[facility] colonoscopy:88203}.  DEXA: {[facility] mammo/dexa:61110}.    Exercise: {exercise level:03199}  Calcium/Vitamin D: {[facility] Calc/vit D:42479}    {Common ambulatory SmartLinks:30593}    Review of Systems    Review of Systems   Constitutional: Negative for fatigue.   Respiratory: Negative for shortness of breath.    Gastrointestinal: Negative for abdominal pain.   Genitourinary: Negative for dysuria.   Neurological: Negative for headaches.   Psychiatric/Behavioral: Negative for dysphoric mood.         Objective   Physical Exam    /70   Ht 170.2 cm (67\")   Wt 77.1 kg (170 lb)   BMI 26.63 kg/m²   General:   {gen appearance:74769}   Heart: regular rate and rhythm   Lungs: clear to auscultation bilaterally   Breast: {Exam; breast:84526}   Neck: {EXAM; NECK:60826}   Abdomen: {{EXAM; ABDOMEN:65855}   CVA: {CVA tenderness:70120}   Pelvis: {EXAM; PELVIC:12777}   Extremities: {EXAM; EXTREMITY:67035} "   Neurologic: {NEURO:07496}   Psychiatric: {PSYCH:84389}     Assessment/Plan   There are no diagnoses linked to this encounter.    All questions answered.  Breast self exam technique reviewed and patient encouraged to perform self-exam monthly.  Discussed healthy lifestyle modifications.  Recommended 30 minutes of aerobic exercise five times per week.  Discussed calcium needs to prevent osteoporosis.

## 2021-06-17 NOTE — PROGRESS NOTES
"Subjective     Chief Complaint   Patient presents with   • Gynecologic Exam     new pt annual exam c/o had bv in january did not take the medication, now she is having abdominal pain, irregular bleeding.       Adriane Valentin is a 20 y.o. No obstetric history on file. whose LMP is No LMP recorded. presents with     Has noted irregular periods with her pills  Has been on lo loestrin, karelvo and blisovi  She has been on it for 1 month and is now bleeding for 2 wks  Has also used nuva ring with similar issues  Unsure what she would like to do next     Does have h/o elevated prolactin level  Has seen Dr Faye for it in the past  Last prolactin level was done by Sue Cantrell in February and that was elevated-38.7  Prolactin (02/23/2021 10:21)  Was referred to Endo but pt cancelled that appt    H/o BV  Did not take meds for it  would like to be checked for it  Has same partner for past year-first partner    Previously sent to [facility] and saw DEBORAH Peace  Also told she had an abnormal pap  colpo done and advised to rpt pap this year  No results in chart  Will obtain MR    No Additional Complaints Reported    The following portions of the patient's history were reviewed and updated as appropriate:vital signs, allergies, current medications, past family history, past medical history, past social history, past surgical history and problem list      Review of Systems   Genitourinary:positive for abnormal menstrual periods and vaginal discharge     Objective      /70   Ht 170.2 cm (67\")   Wt 77.1 kg (170 lb)   BMI 26.63 kg/m²     Physical Exam    General:   alert, comfortable and no distress   Heart: Not performed today   Lungs: Not performed today.   Breast: Not performed today   Neck: na   Abdomen: {normal findings: soft, non-tender   CVA: Not performed today   Pelvis: External genitalia: normal general appearance  Vaginal: normal mucosa without prolapse or lesions and presence of blood  Cervix: normal " appearance  Adnexa: normal bimanual exam  Uterus: normal single, nontender   Extremities: Not performed today   Neurologic: negative   Psychiatric: Normal affect, judgement, and mood       Lab Review   Labs: No data reviewed     Imaging   No data reviewed    Assessment/Plan     ASSESSMENT  1. Irregular bleeding    2. Contraceptive education    3. Pelvic pain    4. Screen for STD (sexually transmitted disease)    5. H/O abnormal cervical Papanicolaou smear        PLAN  1.   Orders Placed This Encounter   Procedures   • Urine Culture - Urine, Urine, Random Void   • NuSwab VG+ - Swab, Vagina   • Prolactin   • TSH   • POC Urinalysis Dipstick       2. Labs obtained and cultures to be sent. Would recommend further w/u with endo if prolactin level is still elevated. Disc that it could contribute to her irregular bleeding pattern    3. F/u in October for annual exam and rpt pap. Requested MR from Soni Black MA    Follow up: 4 month(s)    DEBORAH Vera  6/28/2021

## 2021-06-18 ENCOUNTER — TELEPHONE (OUTPATIENT)
Dept: OBSTETRICS AND GYNECOLOGY | Age: 21
End: 2021-06-18

## 2021-06-18 LAB
PROLACTIN SERPL-MCNC: 19.2 NG/ML (ref 4.8–23.3)
TSH SERPL DL<=0.005 MIU/L-ACNC: 1.17 UIU/ML (ref 0.45–4.5)

## 2021-06-19 LAB
BACTERIA UR CULT: NORMAL
BACTERIA UR CULT: NORMAL

## 2021-06-21 LAB
A VAGINAE DNA VAG QL NAA+PROBE: NORMAL SCORE
BVAB2 DNA VAG QL NAA+PROBE: NORMAL SCORE
C ALBICANS DNA VAG QL NAA+PROBE: NEGATIVE
C GLABRATA DNA VAG QL NAA+PROBE: NEGATIVE
C TRACH DNA VAG QL NAA+PROBE: NEGATIVE
MEGA1 DNA VAG QL NAA+PROBE: NORMAL SCORE
N GONORRHOEA DNA VAG QL NAA+PROBE: NEGATIVE
T VAGINALIS DNA VAG QL NAA+PROBE: NEGATIVE

## 2021-08-09 RX ORDER — NORETHINDRONE ACETATE AND ETHINYL ESTRADIOL 1MG-20(21)
1 KIT ORAL DAILY
Qty: 90 TABLET | Refills: 1 | Status: SHIPPED | OUTPATIENT
Start: 2021-08-09 | End: 2022-02-21

## 2021-08-18 ENCOUNTER — IMMUNIZATION (OUTPATIENT)
Dept: VACCINE CLINIC | Facility: HOSPITAL | Age: 21
End: 2021-08-18

## 2021-08-18 PROCEDURE — 91300 HC SARSCOV02 VAC 30MCG/0.3ML IM: CPT | Performed by: INTERNAL MEDICINE

## 2021-08-18 PROCEDURE — 0001A: CPT | Performed by: INTERNAL MEDICINE

## 2021-09-08 ENCOUNTER — IMMUNIZATION (OUTPATIENT)
Dept: VACCINE CLINIC | Facility: HOSPITAL | Age: 21
End: 2021-09-08

## 2021-09-08 PROCEDURE — 0002A: CPT | Performed by: INTERNAL MEDICINE

## 2021-09-08 PROCEDURE — 91300 HC SARSCOV02 VAC 30MCG/0.3ML IM: CPT | Performed by: INTERNAL MEDICINE

## 2021-10-18 ENCOUNTER — OFFICE VISIT (OUTPATIENT)
Dept: OBSTETRICS AND GYNECOLOGY | Age: 21
End: 2021-10-18

## 2021-10-18 VITALS
HEIGHT: 67 IN | DIASTOLIC BLOOD PRESSURE: 78 MMHG | BODY MASS INDEX: 27.09 KG/M2 | WEIGHT: 172.6 LBS | SYSTOLIC BLOOD PRESSURE: 130 MMHG

## 2021-10-18 DIAGNOSIS — Z01.419 ENCOUNTER FOR GYNECOLOGICAL EXAMINATION WITHOUT ABNORMAL FINDING: Primary | ICD-10-CM

## 2021-10-18 DIAGNOSIS — Z12.4 SCREENING FOR CERVICAL CANCER: ICD-10-CM

## 2021-10-18 DIAGNOSIS — Z20.2 POSSIBLE EXPOSURE TO STD: ICD-10-CM

## 2021-10-18 DIAGNOSIS — N93.9 ABNORMAL UTERINE BLEEDING (AUB): ICD-10-CM

## 2021-10-18 PROCEDURE — 99385 PREV VISIT NEW AGE 18-39: CPT | Performed by: OBSTETRICS & GYNECOLOGY

## 2021-10-18 PROCEDURE — 99213 OFFICE O/P EST LOW 20 MIN: CPT | Performed by: OBSTETRICS & GYNECOLOGY

## 2021-10-18 RX ORDER — LISDEXAMFETAMINE DIMESYLATE 40 MG/1
CAPSULE ORAL
COMMUNITY
Start: 2021-10-07 | End: 2022-12-06

## 2021-10-21 PROBLEM — R87.612 LOW GRADE SQUAMOUS INTRAEPITHELIAL LESION (LGSIL) ON CERVICAL PAP SMEAR: Status: ACTIVE | Noted: 2021-10-21

## 2021-10-21 LAB
C TRACH RRNA CVX QL NAA+PROBE: NEGATIVE
CONV .: ABNORMAL
CYTOLOGIST CVX/VAG CYTO: ABNORMAL
CYTOLOGY CVX/VAG DOC CYTO: ABNORMAL
CYTOLOGY CVX/VAG DOC THIN PREP: ABNORMAL
DX ICD CODE: ABNORMAL
DX ICD CODE: ABNORMAL
HIV 1 & 2 AB SER-IMP: ABNORMAL
N GONORRHOEA RRNA CVX QL NAA+PROBE: NEGATIVE
OTHER STN SPEC: ABNORMAL
PATHOLOGIST CVX/VAG CYTO: ABNORMAL
RECOM F/U CVX/VAG CYTO: ABNORMAL
STAT OF ADQ CVX/VAG CYTO-IMP: ABNORMAL
T VAGINALIS RRNA SPEC QL NAA+PROBE: NEGATIVE

## 2021-11-15 ENCOUNTER — OFFICE VISIT (OUTPATIENT)
Dept: OBSTETRICS AND GYNECOLOGY | Age: 21
End: 2021-11-15

## 2021-11-15 VITALS
HEIGHT: 67 IN | WEIGHT: 172.8 LBS | BODY MASS INDEX: 27.12 KG/M2 | DIASTOLIC BLOOD PRESSURE: 78 MMHG | SYSTOLIC BLOOD PRESSURE: 132 MMHG

## 2021-11-15 DIAGNOSIS — B37.31 RECURRENT CANDIDIASIS OF VAGINA: ICD-10-CM

## 2021-11-15 DIAGNOSIS — N92.1 BREAKTHROUGH BLEEDING ON OCPS: ICD-10-CM

## 2021-11-15 DIAGNOSIS — N93.9 ABNORMAL UTERINE BLEEDING (AUB): ICD-10-CM

## 2021-11-15 DIAGNOSIS — R87.612 LOW GRADE SQUAMOUS INTRAEPITHELIAL LESION (LGSIL) ON CERVICAL PAP SMEAR: ICD-10-CM

## 2021-11-15 DIAGNOSIS — Z13.9 SPECIAL SCREENING: Primary | ICD-10-CM

## 2021-11-15 LAB
B-HCG UR QL: NEGATIVE
EXPIRATION DATE: NORMAL
INTERNAL NEGATIVE CONTROL: NEGATIVE
INTERNAL POSITIVE CONTROL: POSITIVE
Lab: NORMAL

## 2021-11-15 PROCEDURE — 99214 OFFICE O/P EST MOD 30 MIN: CPT | Performed by: OBSTETRICS & GYNECOLOGY

## 2021-11-15 PROCEDURE — 57456 ENDOCERV CURETTAGE W/SCOPE: CPT | Performed by: OBSTETRICS & GYNECOLOGY

## 2021-11-15 PROCEDURE — 81025 URINE PREGNANCY TEST: CPT | Performed by: OBSTETRICS & GYNECOLOGY

## 2021-11-15 RX ORDER — LISDEXAMFETAMINE DIMESYLATE 30 MG/1
CAPSULE ORAL
COMMUNITY
Start: 2021-10-31

## 2021-11-15 RX ORDER — DEXTROAMPHETAMINE SULFATE 5 MG/1
TABLET ORAL
COMMUNITY
Start: 2021-10-25

## 2021-11-15 RX ORDER — FLUCONAZOLE 150 MG/1
150 TABLET ORAL ONCE
Qty: 2 TABLET | Refills: 0 | Status: SHIPPED | OUTPATIENT
Start: 2021-11-15 | End: 2021-11-15

## 2021-11-15 RX ORDER — ESCITALOPRAM OXALATE 10 MG/1
TABLET ORAL EVERY 24 HOURS
COMMUNITY
End: 2022-12-06

## 2021-11-15 NOTE — PROGRESS NOTES
PROCEDURE NOTE   Adriane Valentin is being see for a Diagnosis of  low-grade squamous intraepithelial neoplasia (LGSIL - encompassing HPV,mild dysplasia,RADHA I)    Patient's last menstrual period was 10/27/2021 (exact date). Menopause no  Pregnant no  Gardasil up to date    COLPOSCOPIC PROCEDURE    5% Acetic Acid was used to prepare the cervix for examination.  There were no lesions of the ectocervix, the squamocolumnar junction was unable to be seen well except for a small sliver on the posterior aspect of the cervix and so an endocervical curettage was also performed.    The total number of biopsies were 0.   The total  number of specimen containers were 1  The procedure was well tolerated. Instructions on vaginal rest and possibly bleeding were discussed as well as follow up for results.    Juanita Matute MD  11/15/2021  10:38 EST

## 2021-11-15 NOTE — PROGRESS NOTES
"Subjective     Chief Complaint   Patient presents with   • Gynecologic Exam     Follow up irregular bleeding with US. Last pap 10/18/2021 LSIL       Adriane Valentin is a 21 y.o.  whose LMP is Patient's last menstrual period was 10/27/2021 (exact date). presents to follow up on irregular bleeding. She has irregular menses, oligomenorrhea then prolonged bleeding off OCPs and also on them. She has used multiple different ocps and also nuvaring since 2016 (reviewed records) and has not been able to have any menstrual regulation. May be interested in an IUD, the mirena.  Also notes issues with frequent itching and abnormal discharge at the end of bleeding, has boric acid suppositories she has used in the past with some relief.    Objective      /78   Ht 170.2 cm (67\")   Wt 78.4 kg (172 lb 12.8 oz)   LMP 10/27/2021 (Exact Date)   Breastfeeding No   BMI 27.06 kg/m²     Physical Exam   Well, no distress  Regular, nonlabored breathing  See colposcopy note for details of cervix, appears to have 20 white discharge on the vagina and mild irritation    Transvaginal ultrasound with normal-appearing, small anteverted uterus with thin lining.  Possible PCO appearance of ovaries.    Assessment/Plan     Diagnoses and all orders for this visit:    1. Special screening (Primary)  -     POC Pregnancy, Urine    2. Recurrent candidiasis of vagina    3. Abnormal uterine bleeding (AUB)    4. Breakthrough bleeding on OCPs    Other orders  -     fluconazole (DIFLUCAN) 150 MG tablet; Take 1 tablet by mouth 1 (One) Time for 1 dose. And repeat in three days  Dispense: 2 tablet; Refill: 0      For the abnormal bleeding, she has tried many different OCPs and NuvaRing without relief, and recommended trying Mirena IUD for good contraception and also may obtain amenorrhea with the Mirena.  She is not ready at the moment to schedule, she will consider.  May have PCOS, discussed she is likely anovulatory and this leads to abnormal " bleeding when not on contraception.  Is unclear why she cannot obtain better menstrual profile with multiple different OCPs.  Colpo today for LSIL pap, no lesions noted and ECC performed. Reviewed natural hx of HPV and paps, did receive gardasil.  She appears to have candidiasis today and Diflucan sent into the pharmacy.  Also discussed using boric acid at the end of menses.    Follow-up for Mirena if desires she wants 1, otherwise we will call with results from her colpo and likely just needs annual next October if not wanting to change her contraception.    Juanita Matute MD  11/15/2021

## 2021-11-16 ENCOUNTER — TELEPHONE (OUTPATIENT)
Dept: OBSTETRICS AND GYNECOLOGY | Age: 21
End: 2021-11-16

## 2021-11-16 RX ORDER — DROSPIRENONE AND ESTETROL 3-14.2(28)
1 KIT ORAL DAILY
Qty: 56 TABLET | Refills: 0 | COMMUNITY
Start: 2021-11-16 | End: 2022-02-21 | Stop reason: SDUPTHER

## 2021-11-16 NOTE — TELEPHONE ENCOUNTER
Called patient, thought today of her trying nexstellis as she may have less abnormal bleeding with it.  She desires to  3 packs at the [place] office, we will put them at the front for her.    Juanita Matute MD  11/16/2021  17:30 EST

## 2021-11-17 LAB
DX ICD CODE: NORMAL
DX ICD CODE: NORMAL
PATH REPORT.FINAL DX SPEC: NORMAL
PATH REPORT.GROSS SPEC: NORMAL
PATH REPORT.SITE OF ORIGIN SPEC: NORMAL
PATHOLOGIST NAME: NORMAL
PAYMENT PROCEDURE: NORMAL

## 2022-02-21 ENCOUNTER — PATIENT MESSAGE (OUTPATIENT)
Dept: OBSTETRICS AND GYNECOLOGY | Age: 22
End: 2022-02-21

## 2022-02-21 RX ORDER — DROSPIRENONE AND ESTETROL 3-14.2(28)
1 KIT ORAL DAILY
Qty: 28 TABLET | Refills: 11 | Status: SHIPPED | OUTPATIENT
Start: 2022-02-21 | End: 2022-12-06 | Stop reason: SINTOL

## 2022-02-21 NOTE — TELEPHONE ENCOUNTER
From: Adriane Valentin  To: Juanita Matute MD  Sent: 2/21/2022 11:51 AM EST  Subject: Nextstellis refill    Hi Dr. Matute,  I’m finishing up my third sample pack of Nextstellis so I was wondering if you could send a prescription over. Thanks so much!  Adriane Valentin

## 2022-12-06 ENCOUNTER — OFFICE VISIT (OUTPATIENT)
Dept: OBSTETRICS AND GYNECOLOGY | Age: 22
End: 2022-12-06

## 2022-12-06 VITALS
DIASTOLIC BLOOD PRESSURE: 76 MMHG | BODY MASS INDEX: 29.03 KG/M2 | HEIGHT: 67 IN | WEIGHT: 185 LBS | SYSTOLIC BLOOD PRESSURE: 124 MMHG

## 2022-12-06 DIAGNOSIS — Z12.4 ENCOUNTER FOR PAPANICOLAOU SMEAR FOR CERVICAL CANCER SCREENING: ICD-10-CM

## 2022-12-06 DIAGNOSIS — R87.612 LOW GRADE SQUAMOUS INTRAEPITHELIAL LESION (LGSIL) ON CERVICAL PAP SMEAR: ICD-10-CM

## 2022-12-06 DIAGNOSIS — N92.6 IRREGULAR MENSES: ICD-10-CM

## 2022-12-06 DIAGNOSIS — Z01.419 WELL WOMAN EXAM WITH ROUTINE GYNECOLOGICAL EXAM: Primary | ICD-10-CM

## 2022-12-06 DIAGNOSIS — Z87.42 H/O ABNORMAL CERVICAL PAPANICOLAOU SMEAR: ICD-10-CM

## 2022-12-06 PROCEDURE — 99395 PREV VISIT EST AGE 18-39: CPT | Performed by: PHYSICIAN ASSISTANT

## 2022-12-06 PROCEDURE — 99213 OFFICE O/P EST LOW 20 MIN: CPT | Performed by: PHYSICIAN ASSISTANT

## 2022-12-06 RX ORDER — DROSPIRENONE 4 MG/1
1 TABLET, FILM COATED ORAL DAILY
Qty: 28 TABLET | Refills: 1 | Status: SHIPPED | OUTPATIENT
Start: 2022-12-06 | End: 2023-02-27 | Stop reason: SDUPTHER

## 2022-12-06 NOTE — PROGRESS NOTES
Subjective     Chief Complaint   Patient presents with   • Gynecologic Exam     Annual exam last pap 10/18/2021 LGSIL, colpo 11/15/2021  C/o irregular bleeding, bleeds for 2 weeks then off for 1.5 weeks then starts again.       History of Present Illness    Adriane Valentin is a 22 y.o.  who presents for annual exam.    She is noting irregular periods on her combo ocp  Is considering slynd and d/w Dr sanabria  Has tried multiple ocp's so far     She uses her pills for cycle control  Has heavy periods and painful  Also uses it for bc but wants to get her cycles under control    Possibly PCOS  Does have hair growth on her face    Her menses are irregular, lasting 0-3 days, dysmenorrhea none   Obstetric History:  OB History        0    Para   0    Term   0       0    AB   0    Living   0       SAB   0    IAB   0    Ectopic   0    Molar   0    Multiple   0    Live Births   0               Menstrual History:     Patient's last menstrual period was 2022 (exact date).         Current contraception: OCP (estrogen/progesterone)  History of abnormal Pap smear: yes - lgsil and had colpo last year  Received Gardasil immunization: yes  Perform regular self breast exam: yes - occl  Family history of uterine or ovarian cancer: no  Family History of colon cancer: no  Family history of breast cancer: no    Mammogram: not indicated.  Colonoscopy: not indicated.  DEXA: not indicated.    Exercise: moderately active  Calcium/Vitamin D: adequate intake    The following portions of the patient's history were reviewed and updated as appropriate: allergies, current medications, past family history, past medical history, past social history, past surgical history and problem list.    Review of Systems   Genitourinary: Positive for menstrual problem (irregular menses).   All other systems reviewed and are negative.      Review of Systems   Constitutional: Negative for fatigue.   Respiratory: Negative for shortness of  "breath.    Gastrointestinal: Negative for abdominal pain.   Genitourinary: Negative for dysuria.   Neurological: Negative for headaches.   Psychiatric/Behavioral: Negative for dysphoric mood.         Objective   Physical Exam    /76   Ht 170.2 cm (67\")   Wt 83.9 kg (185 lb)   LMP 11/28/2022 (Exact Date)   BMI 28.98 kg/m²   General:   alert, comfortable and no distress   Heart: regular rate and rhythm   Lungs: clear to auscultation bilaterally   Breast: normal appearance, no masses or tenderness, Inspection negative, No nipple retraction or dimpling, No nipple discharge or bleeding, No axillary or supraclavicular adenopathy, Normal to palpation without dominant masses   Neck: no adenopathy and no carotid bruit   Abdomen: normal findings: soft, non-tender   CVA: Not performed today   Pelvis: External genitalia: normal general appearance  Vaginal: normal mucosa without prolapse or lesions  Cervix: normal appearance and thin prep PAP obtained  Adnexa: normal bimanual exam  Uterus: normal single, nontender   Extremities: Not performed today   Neurologic: negative   Psychiatric: Normal affect, judgement, and mood     Assessment & Plan   Diagnoses and all orders for this visit:    1. Well woman exam with routine gynecological exam (Primary)    2. Irregular menses  -     TSH  -     Prolactin    3. Encounter for Papanicolaou smear for cervical cancer screening    4. H/O abnormal cervical Papanicolaou smear  -     IGP,CtNgTv,Apt HPV    5. Low grade squamous intraepithelial lesion (LGSIL) on cervical Pap smear  -     IGP,CtNgTv,Apt HPV        All questions answered.  Breast self exam technique reviewed and patient encouraged to perform self-exam monthly.  Discussed healthy lifestyle modifications.  Recommended 30 minutes of aerobic exercise five times per week.  Discussed calcium needs to prevent osteoporosis.    Pap done d/t h/o abnormal  Agreeable to std testing   Will rpt labs to ensure they are still stable "   Start slynd, samples given   Enc to consider an IUD as well and gave HO on kyleena

## 2022-12-07 LAB
PROLACTIN SERPL-MCNC: 14.5 NG/ML (ref 4.8–23.3)
TSH SERPL DL<=0.005 MIU/L-ACNC: 2.08 UIU/ML (ref 0.27–4.2)

## 2022-12-13 LAB
C TRACH RRNA CVX QL NAA+PROBE: NEGATIVE
CYTOLOGIST CVX/VAG CYTO: ABNORMAL
CYTOLOGY CVX/VAG DOC CYTO: ABNORMAL
CYTOLOGY CVX/VAG DOC THIN PREP: ABNORMAL
DX ICD CODE: ABNORMAL
DX ICD CODE: ABNORMAL
HIV 1 & 2 AB SER-IMP: ABNORMAL
HPV I/H RISK 4 DNA CVX QL PROBE+SIG AMP: NEGATIVE
N GONORRHOEA RRNA CVX QL NAA+PROBE: NEGATIVE
OTHER STN SPEC: ABNORMAL
PATHOLOGIST CVX/VAG CYTO: ABNORMAL
RECOM F/U CVX/VAG CYTO: ABNORMAL
STAT OF ADQ CVX/VAG CYTO-IMP: ABNORMAL
T VAGINALIS RRNA SPEC QL NAA+PROBE: NEGATIVE

## 2023-02-27 RX ORDER — DROSPIRENONE 4 MG/1
1 TABLET, FILM COATED ORAL DAILY
Qty: 28 TABLET | Refills: 1 | Status: SHIPPED | OUTPATIENT
Start: 2023-02-27 | End: 2023-03-21 | Stop reason: SDUPTHER

## 2023-03-21 RX ORDER — DROSPIRENONE 4 MG/1
1 TABLET, FILM COATED ORAL DAILY
Qty: 64 TABLET | Refills: 3 | Status: SHIPPED | OUTPATIENT
Start: 2023-03-21

## 2023-10-27 NOTE — PATIENT INSTRUCTIONS
Tuberculin Skin Test  Why am I having this test?  Tuberculosis (TB) is a bacterial infection caused by Mycobacterium tuberculosis. Most people who are exposed to these bacteria have a strong enough defense (immune) system to prevent the bacteria from causing TB and developing symptoms. Their bodies prevent the germs from being active and making them sick (latent TB infection).  However, if you have TB germs in your body and your immune system is weak, you can develop a TB infection. This can cause symptoms such as:  · Night sweats.  · Fever.  · Weakness.  · Weight loss.  A latent TB infection can also become active later in life if your immune system becomes weakened or compromised.  You may have this test if your health care provider suspects that you have TB. You may also have this test to screen for TB if you are at risk for getting the disease. Those at increased risk include:  · People who inject illegal drugs or share needles.  · People with HIV or other diseases that affect immunity.  · Health care workers.  · People who live in high-risk communities, such as homeless shelters, nursing homes, and correctional facilities.  · People who have been in contact with someone with TB.  · People from countries where TB is more common.  If you are in a high-risk group, your health care provider may wish to screen for TB more often. This can help prevent the spread of the disease. Sometimes TB screening is required when starting a new job, such as becoming a health care worker or a teacher. Colleges or universities may require it of new students.  What is being tested?  A tuberculin skin test is the main test used to check for exposure to the bacteria that can cause TB. The test checks for antibodies to the bacteria. Antibodies are proteins that your body produces to protect you from germs and other things that can make you sick.  Your health care provider will inject a solution known as PPD (purified protein  lvm -provider will be out of office 11/13/23-ok for hub to reschedule    derivative) under the first layer of skin on your arm. This causes a blister-like bubble to form at the site. Your health care provider will then examine the site after a number of hours have passed to see if a reaction has occurred.  How do I prepare for this test?  There is no preparation required for this test.  What do the results mean?  Your test results will be reported as either negative or positive.  If the tuberculin skin test produces a negative result, it is likely that you do not have TB and have not been exposed to the TB bacteria.  If you or your health care provider suspects exposure, however, you may want to repeat the test a few weeks later. A blood test may also be used to check for TB. This is because you will not react to the tuberculin skin test until several weeks after exposure to TB bacteria.  If you test positive to the tuberculin skin test, it is likely that you have been exposed to TB bacteria. The test does not distinguish between an active and a latent TB infection.  A false-positive result can occur. A false-positive result for TB bacteria is incorrect because it indicates a condition or finding is present when it is not.  Talk to your health care provider to discuss your results, treatment options, and if necessary, the need for more tests.  It is your responsibility to obtain your test results. Ask the lab or department performing the test when and how you will get your results. Talk with your health care provider if you have any questions about your results.  Talk with your health care provider to discuss your results, treatment options, and if necessary, the need for more tests. Talk with your health care provider if you have any questions about your results.  This information is not intended to replace advice given to you by your health care provider. Make sure you discuss any questions you have with your health care provider.  Document Released: 09/27/2006 Document Revised:  08/20/2017 Document Reviewed: 04/13/2015  Elsevier Interactive Patient Education © 2017 [employer]

## 2023-11-09 RX ORDER — DROSPIRENONE 4 MG/1
1 TABLET, FILM COATED ORAL DAILY
Qty: 64 TABLET | Refills: 3 | Status: SHIPPED | OUTPATIENT
Start: 2023-11-09

## 2024-04-16 NOTE — PROGRESS NOTES
Please see the attached refill request.   Routine Annual Visit    10/18/2021    Patient: Adriane Valentin          MR#:[id number]      Chief Complaint   Patient presents with   • Gynecologic Exam     AE Today, repeat pap. Pt states her cycles are lasting around 10 days.        History of Present Illness    21 y.o. female  who presents for annual exam.     Off ocps she had irregular menses, often twice a month she states and lasting 10-15 days  No bleeding issues after tonsils or wisdom teeth  Bled for 5 months or so with lo loestrin, on highest estrogen dose had side effects.    Health Maintenance  Gardasil yes  Last pap:  abnormal- do not have result, had colpo that was normal. [facility].  Mom breast cancer age 52 negative genetic testing    Patient's last menstrual period was 10/06/2021 (exact date).  Obstetric History:  OB History        0    Para   0    Term   0       0    AB   0    Living   0       SAB   0    IAB   0    Ectopic   0    Molar   0    Multiple   0    Live Births   0               Menstrual History:     Patient's last menstrual period was 10/06/2021 (exact date).       ________________________________________  Patient Active Problem List   Diagnosis   • Anxiety   • Depression   • Chronic nonintractable headache   • Weight gain   • Hyperprolactinemia (HCC)   • Dizziness   • Chronic fatigue   • Increased prolactin level       Past Medical History:   Diagnosis Date   • Abnormal Pap smear of cervix 2020   • Anemia    • Anxiety    • Depression    • HPV (human papilloma virus) infection 2020   • Increased prolactin level        Family History   Problem Relation Age of Onset   • Diabetes Mother    • Thyroid disease Mother    • Depression Mother    • Breast cancer Mother    • Arthritis Father    • Arthritis Sister    • Depression Sister        Past Surgical History:   Procedure Laterality Date   • ADENOIDECTOMY     • TONSILLECTOMY     • WISDOM TOOTH EXTRACTION         Social History     Tobacco Use   Smoking Status  "Never Smoker   Smokeless Tobacco Never Used       has a current medication list which includes the following prescription(s): blisovi fe 1/20, ferrous sulfate, multivitamin with minerals, vyvanse, amoxicillin-clavulanate, fluticasone, and lisdexamfetamine.  ________________________________________      Review of Systems   Constitutional: Negative for fever and unexpected weight change.   Respiratory: Negative for shortness of breath.    Cardiovascular: Negative for chest pain.   Gastrointestinal: Negative for abdominal pain, constipation and diarrhea.   Genitourinary: Positive for menstrual problem. Negative for frequency and urgency.   Hematological: Negative for adenopathy.   Psychiatric/Behavioral: Negative for dysphoric mood.       Objective   Physical Exam    /78   Ht 170.2 cm (67\")   Wt 78.3 kg (172 lb 9.6 oz)   LMP 10/06/2021 (Exact Date)   Breastfeeding No   BMI 27.03 kg/m²    BP Readings from Last 3 Encounters:   10/18/21 130/78   07/27/21 121/79   06/17/21 116/70      Wt Readings from Last 3 Encounters:   10/18/21 78.3 kg (172 lb 9.6 oz)   07/27/21 77.1 kg (170 lb)   06/17/21 77.1 kg (170 lb)         BMI: Body mass index is 27.03 kg/m².       General:   alert, appears stated age and cooperative   Neck: No thyromegaly or LAD, no carotid bruit noted   Heart:: regular rate and rhythm, S1, S2 normal, no murmur, click, rub or gallop   Lungs: normal respiratory effort and auscultation   Abdomen: soft, non-tender, without masses or organomegaly   Breast: inspection negative, no nipple discharge or bleeding, no masses or nodularity palpable   Urethra and bladder: urethral meatus normal; bladder nontender to palpation;   Vulva: normal, Bartholin's, Urethra, Allen's normal   Vagina: normal mucosa, normal discharge   Cervix: no lesions and nulliparous appearance   Uterus: normal size and anteverted   Adnexa: normal adnexa and no mass, fullness, tenderness       Assessment:    normal annual exam   Abnormal " uterine bleeding    Plan:    Plan     []  Mammogram request made  [x]  PAP done  []  Labs:   []  GC/Chl/TV  []  DEXA scan   []  Referral for colonoscopy:     Request prior pap from [facility]  Return for GYN US, may have polyp etc  Repeat PRL normal   Consider mirena or kyleena for better contraception and to help with bleeding if normal US    Counseling  [x]  Nutrition  [x]  Physical activity/regular exercise   [x]  Healthy weight  []  Injury prevention  []  Smoking cessation  []  Substance misuse/abuse  [x]  Sexual behavior  [x]  STD prevention  [x]  Contraception  []  Dental health  []  Mental health  []  Immunization  [x]  Encouraged SBE        Juanita Matute MD  10/18/2021  10:57 EDT

## 2024-06-10 ENCOUNTER — OFFICE VISIT (OUTPATIENT)
Dept: OBSTETRICS AND GYNECOLOGY | Age: 24
End: 2024-06-10
Payer: COMMERCIAL

## 2024-06-10 VITALS
SYSTOLIC BLOOD PRESSURE: 124 MMHG | BODY MASS INDEX: 36.1 KG/M2 | DIASTOLIC BLOOD PRESSURE: 80 MMHG | HEIGHT: 67 IN | WEIGHT: 230 LBS

## 2024-06-10 DIAGNOSIS — Z12.4 ENCOUNTER FOR PAPANICOLAOU SMEAR FOR CERVICAL CANCER SCREENING: ICD-10-CM

## 2024-06-10 DIAGNOSIS — R63.5 WEIGHT GAIN: ICD-10-CM

## 2024-06-10 DIAGNOSIS — Z01.419 WELL WOMAN EXAM WITH ROUTINE GYNECOLOGICAL EXAM: Primary | ICD-10-CM

## 2024-06-10 DIAGNOSIS — Z11.3 SCREEN FOR STD (SEXUALLY TRANSMITTED DISEASE): ICD-10-CM

## 2024-06-10 DIAGNOSIS — Z87.42 H/O ABNORMAL CERVICAL PAPANICOLAOU SMEAR: ICD-10-CM

## 2024-06-10 DIAGNOSIS — R53.83 OTHER FATIGUE: ICD-10-CM

## 2024-06-10 PROCEDURE — 99395 PREV VISIT EST AGE 18-39: CPT | Performed by: PHYSICIAN ASSISTANT

## 2024-06-10 RX ORDER — DROSPIRENONE 4 MG/1
1 TABLET, FILM COATED ORAL DAILY
Qty: 64 TABLET | Refills: 3 | Status: SHIPPED | OUTPATIENT
Start: 2024-06-10

## 2024-06-10 RX ORDER — ESCITALOPRAM OXALATE 5 MG
TABLET ORAL
COMMUNITY
Start: 2023-04-07

## 2024-06-10 NOTE — PROGRESS NOTES
Subjective     Chief Complaint   Patient presents with    Annual Exam     Annual exam last pap 2022 Ascus/ neg       History of Present Illness    Adriane Valentin is a 23 y.o.  who presents for annual exam.    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         Current contraception: oral progesterone-only contraceptive  History of abnormal Pap smear: yes - ascus/neg hpv  Received Gardasil immunization: yes  Perform regular self breast exam: yes - occl  Family history of uterine or ovarian cancer: no  Family History of colon cancer: no  Family history of breast cancer: yes - mom?    Mammogram: not indicated.  Colonoscopy: not indicated.  DEXA: not indicated.    Exercise: not active  Calcium/Vitamin D: adequate intake    The following portions of the patient's history were reviewed and updated as appropriate: allergies, current medications, past family history, past medical history, past social history, past surgical history, and problem list.    Review of Systems   Constitutional:  Positive for unexpected weight change.   All other systems reviewed and are negative.      Review of  "Systems   Constitutional: Negative for fatigue.   Respiratory: Negative for shortness of breath.    Gastrointestinal: Negative for abdominal pain.   Genitourinary: Negative for dysuria.   Neurological: Negative for headaches.   Psychiatric/Behavioral: Negative for dysphoric mood.         Objective   Physical Exam    /80   Ht 170.2 cm (67\")   Wt 104 kg (230 lb)   LMP 05/05/2024 (Exact Date) Comment: takes continuious bcp.  BMI 36.02 kg/m²   General:   alert, comfortable, and no distress   Heart: regular rate and rhythm   Lungs: clear to auscultation bilaterally   Breast: normal appearance, no masses or tenderness, Inspection negative, No nipple retraction or dimpling, No nipple discharge or bleeding, No axillary or supraclavicular adenopathy, Normal to palpation without dominant masses   Neck: no adenopathy and no carotid bruit   Abdomen: Not performed today   CVA: Not performed today   Pelvis: External genitalia: normal general appearance  Vaginal: normal mucosa without prolapse or lesions  Cervix: normal appearance and thin prep PAP obtained  Adnexa: normal bimanual exam  Uterus: normal single, nontender  Exam limited by body habitus   Extremities: Not performed today   Neurologic: negative   Psychiatric: Normal affect, judgement, and mood     Assessment & Plan   Diagnoses and all orders for this visit:    1. Well woman exam with routine gynecological exam (Primary)    2. Weight gain  -     TSH  -     Hemoglobin A1c    3. Other fatigue  -     TSH  -     CBC & Differential  -     Ferritin    4. Encounter for Papanicolaou smear for cervical cancer screening  -     IGP, CtNg, Rfx Aptima HPV ASCU    5. Screen for STD (sexually transmitted disease)  -     IGP, CtNg, Rfx Aptima HPV ASCU    6. H/O abnormal cervical Papanicolaou smear    Other orders  -     Drospirenone (Slynd) 4 MG tablet; Take 1 tablet by mouth Daily.  Dispense: 64 tablet; Refill: 3        All questions answered.  Breast self exam technique " reviewed and patient encouraged to perform self-exam monthly.  Discussed healthy lifestyle modifications.  Recommended 30 minutes of aerobic exercise five times per week.  Discussed calcium needs to prevent osteoporosis.      Pap done d/t h/o abnormal  Labs ordered to assess fatigue and weight gain  She will work on increasing physical activity and incorporate weight bearing exercises

## 2024-06-11 LAB
BASOPHILS # BLD AUTO: 0.1 10*3/MM3 (ref 0–0.2)
BASOPHILS NFR BLD AUTO: 1.1 % (ref 0–1.5)
EOSINOPHIL # BLD AUTO: 0.16 10*3/MM3 (ref 0–0.4)
EOSINOPHIL NFR BLD AUTO: 1.8 % (ref 0.3–6.2)
ERYTHROCYTE [DISTWIDTH] IN BLOOD BY AUTOMATED COUNT: 12.2 % (ref 12.3–15.4)
FERRITIN SERPL-MCNC: 47.2 NG/ML (ref 13–150)
HBA1C MFR BLD: 5.4 % (ref 4.8–5.6)
HCT VFR BLD AUTO: 41.8 % (ref 34–46.6)
HGB BLD-MCNC: 14.2 G/DL (ref 12–15.9)
IMM GRANULOCYTES # BLD AUTO: 0.06 10*3/MM3 (ref 0–0.05)
IMM GRANULOCYTES NFR BLD AUTO: 0.7 % (ref 0–0.5)
LYMPHOCYTES # BLD AUTO: 2.25 10*3/MM3 (ref 0.7–3.1)
LYMPHOCYTES NFR BLD AUTO: 24.7 % (ref 19.6–45.3)
MCH RBC QN AUTO: 28.2 PG (ref 26.6–33)
MCHC RBC AUTO-ENTMCNC: 34 G/DL (ref 31.5–35.7)
MCV RBC AUTO: 83.1 FL (ref 79–97)
MONOCYTES # BLD AUTO: 0.68 10*3/MM3 (ref 0.1–0.9)
MONOCYTES NFR BLD AUTO: 7.5 % (ref 5–12)
NEUTROPHILS # BLD AUTO: 5.86 10*3/MM3 (ref 1.7–7)
NEUTROPHILS NFR BLD AUTO: 64.2 % (ref 42.7–76)
NRBC BLD AUTO-RTO: 0 /100 WBC (ref 0–0.2)
PLATELET # BLD AUTO: 371 10*3/MM3 (ref 140–450)
RBC # BLD AUTO: 5.03 10*6/MM3 (ref 3.77–5.28)
TSH SERPL DL<=0.005 MIU/L-ACNC: 1.38 UIU/ML (ref 0.27–4.2)
WBC # BLD AUTO: 9.11 10*3/MM3 (ref 3.4–10.8)

## 2024-06-12 LAB
C TRACH RRNA CVX QL NAA+PROBE: NEGATIVE
CONV .: NORMAL
CYTOLOGIST CVX/VAG CYTO: NORMAL
CYTOLOGY CVX/VAG DOC CYTO: NORMAL
CYTOLOGY CVX/VAG DOC THIN PREP: NORMAL
DX ICD CODE: NORMAL
Lab: NORMAL
N GONORRHOEA RRNA CVX QL NAA+PROBE: NEGATIVE
OTHER STN SPEC: NORMAL
STAT OF ADQ CVX/VAG CYTO-IMP: NORMAL

## 2025-02-14 RX ORDER — DROSPIRENONE 4 MG/1
1 TABLET, FILM COATED ORAL DAILY
Qty: 64 TABLET | Refills: 1 | Status: SHIPPED | OUTPATIENT
Start: 2025-02-14

## 2025-04-16 ENCOUNTER — OFFICE VISIT (OUTPATIENT)
Dept: FAMILY MEDICINE CLINIC | Facility: CLINIC | Age: 25
End: 2025-04-16
Payer: COMMERCIAL

## 2025-04-16 VITALS
HEART RATE: 97 BPM | WEIGHT: 245 LBS | DIASTOLIC BLOOD PRESSURE: 74 MMHG | TEMPERATURE: 98 F | HEIGHT: 67 IN | OXYGEN SATURATION: 99 % | BODY MASS INDEX: 38.45 KG/M2 | SYSTOLIC BLOOD PRESSURE: 110 MMHG

## 2025-04-16 DIAGNOSIS — Z13.1 SCREENING FOR DIABETES MELLITUS: ICD-10-CM

## 2025-04-16 DIAGNOSIS — Z13.220 SCREENING CHOLESTEROL LEVEL: ICD-10-CM

## 2025-04-16 DIAGNOSIS — L68.0 HIRSUTISM: ICD-10-CM

## 2025-04-16 DIAGNOSIS — H81.11 BENIGN PAROXYSMAL POSITIONAL VERTIGO OF RIGHT EAR: ICD-10-CM

## 2025-04-16 DIAGNOSIS — E22.1 HYPERPROLACTINEMIA: ICD-10-CM

## 2025-04-16 DIAGNOSIS — R53.83 OTHER FATIGUE: ICD-10-CM

## 2025-04-16 DIAGNOSIS — R05.2 SUBACUTE COUGH: ICD-10-CM

## 2025-04-16 DIAGNOSIS — R42 VERTIGO: ICD-10-CM

## 2025-04-16 DIAGNOSIS — Z00.00 WELLNESS EXAMINATION: Primary | ICD-10-CM

## 2025-04-16 DIAGNOSIS — E53.8 B12 DEFICIENCY: ICD-10-CM

## 2025-04-16 PROBLEM — R51.9 CHRONIC NONINTRACTABLE HEADACHE: Status: RESOLVED | Noted: 2017-09-22 | Resolved: 2025-04-16

## 2025-04-16 PROBLEM — F98.8 ADD (ATTENTION DEFICIT DISORDER): Status: ACTIVE | Noted: 2018-12-12

## 2025-04-16 PROBLEM — B37.31 RECURRENT CANDIDIASIS OF VAGINA: Status: RESOLVED | Noted: 2021-11-15 | Resolved: 2025-04-16

## 2025-04-16 PROBLEM — G89.29 CHRONIC NONINTRACTABLE HEADACHE: Status: RESOLVED | Noted: 2017-09-22 | Resolved: 2025-04-16

## 2025-04-16 RX ORDER — LISDEXAMFETAMINE DIMESYLATE 40 MG/1
40 CAPSULE ORAL EVERY MORNING
COMMUNITY

## 2025-04-16 RX ORDER — CETIRIZINE HYDROCHLORIDE 10 MG/1
10 TABLET ORAL DAILY
COMMUNITY

## 2025-04-16 NOTE — PROGRESS NOTES
Chief Complaint  Establish Care    Subjective        Adriane Valentin presents to [facility]    History of Present Illness  The patient presents for evaluation of persistent cough, hyperprolactinemia, weight gain, PCOS, anxiety and depression, dizziness, and health maintenance.    She has been experiencing a persistent cough since the end of 02/2025, which worsened in 03/2025. The cough is occasionally productive, yielding greenish sputum. She reports no possibility of pregnancy. She experienced 3 consecutive illnesses between 02/2025 and 03/2025, including a cold, COVID-19, and a respiratory infection that initially presented as a cough. The cough has since improved but persists. She does not take any allergy medication daily but uses over-the-counter Zyrtec 10 mg as needed.    A history of hyperprolactinemia is noted, with elevated prolactin levels recorded once or twice. No symptoms such as lactation or nipple discharge are reported. An MRI of the head was not performed following the elevated prolactin levels. Referral to an endocrinologist was made, and PCOS was suspected.    Weight has been fluctuating, with significant weight loss during the COVID-19 pandemic due to stress. Lexapro was started 2 years ago, and birth control was changed due to bleeding issues. Weight has steadily increased over the past 2 years. Weight was 150 pounds at its lowest in 2020 and around 170 pounds in 2022. Eating habits and exercise routines have not changed. Both parents struggle with weight issues. Currently, a progesterone-only birth control pill is used, and menstruation does not occur. Approximately 10 different birth control pills have been tried to manage symptoms. An IUD has never been used due to fear of the insertion process and potential discomfort from the strings.    A history of PCOS is noted, with multiple follicles observed on ultrasound, but blood work was not significantly abnormal.  Hirsutism is experienced, requiring hair removal every 3 days. Pregnancy has never been attempted. Periods were heavy and lasted for a week when not on birth control. Treatment for PCOS with metformin or spironolactone has not been undertaken.    Anxiety and depression are present, with anxiety being the primary concern. A psychiatrist and therapist are seen, although less frequently recently due to stability in the condition. Lexapro was used in high school, discontinued in 2020, and resumed 2 years ago at a dose of 5 mg. The dose was briefly increased to 7.5 mg but returned to 5 mg. Currently tapering down to 2.5 mg. Vyvanse is also taken.    Dizziness has been experienced for the past 6 months, initially attributed to anxiety but now believed to be vertigo. Dizziness occurs predominantly at night when turning to the right side, causing eyes to feel as if they are shaking. Random dizzy spells are also experienced, such as feeling like spinning to the right side. Dizziness was daily for about 4 months but has been less frequent recently. An Epley maneuver has not been performed.    Three doses of the HPV vaccine and two doses of the COVID-19 vaccine have been received. Screening for hepatitis C is believed to have occurred when starting the job. She is a nurse in orthopedics.    SOCIAL HISTORY  She does not smoke. She occasionally drinks alcohol, about once or twice a week.    FAMILY HISTORY  Her mother is a type I diabetic and has high blood pressure. Her father has A-fib and has always struggled with weight gain. Her sister has also struggled with weight gain.  HPV VACCINES(1 - 3-dose series) Never done  HEPATITIS C SCREENING Never done  ANNUAL PHYSICAL Never done  COVID-19 Vaccine(3 - 2024-25 season) due on 09/01/2024  INFLUENZA VACCINE due on 07/01/2025  PAP SMEAR due on 06/10/2027  TDAP/TD VACCINES(2 - Td or Tdap) due on 10/08/2031  Pneumococcal Vaccine 0-49 Aged Out        Objective   Vital Signs:  /74  "(BP Location: Left arm, Patient Position: Sitting, Cuff Size: Adult)   Pulse 97   Temp 98 °F (36.7 °C)   Ht 170.2 cm (67\")   Wt 111 kg (245 lb)   SpO2 99%   BMI 38.37 kg/m²   Estimated body mass index is 38.37 kg/m² as calculated from the following:    Height as of this encounter: 170.2 cm (67\").    Weight as of this encounter: 111 kg (245 lb).          Physical Exam  Vitals and nursing note reviewed.   Constitutional:       General: She is not in acute distress.     Appearance: Normal appearance. She is not ill-appearing.   HENT:      Head: Normocephalic and atraumatic.      Nose: Nose normal.      Mouth/Throat:      Mouth: Mucous membranes are moist.   Eyes:      Extraocular Movements: Extraocular movements intact.      Conjunctiva/sclera: Conjunctivae normal.   Cardiovascular:      Rate and Rhythm: Normal rate and regular rhythm.      Heart sounds: Normal heart sounds. No murmur heard.     No gallop.   Pulmonary:      Effort: Pulmonary effort is normal. No respiratory distress.      Breath sounds: Normal breath sounds. No stridor. No wheezing, rhonchi or rales.   Chest:      Chest wall: No tenderness.   Skin:     General: Skin is warm and dry.   Neurological:      General: No focal deficit present.      Mental Status: She is alert and oriented to person, place, and time. Mental status is at baseline.   Psychiatric:         Mood and Affect: Mood normal.         Behavior: Behavior normal.          Physical Exam  Respiratory: Clear to auscultation, no wheezing, rales or rhonchi  Cardiovascular: Regular rate and rhythm, no murmurs, rubs, or gallops       Result Review :               Results  Labs   - Prolactin level: 2021, 38.7 ng/mL          Assessment and Plan   Diagnoses and all orders for this visit:    1. Wellness examination (Primary)    2. Subacute cough  -     XR Chest PA & Lateral (In Office)    3. Vertigo  -     TSH  -     CBC auto differential    4. Other fatigue  -     TSH  -     Ferritin  -     " CBC auto differential    5. Hyperprolactinemia  -     Follicle stimulating hormone  -     Luteinizing hormone  -     Prolactin  -     Von Willebrand panel  -     Ferritin  -     CBC auto differential  -     DHEA-sulfate  -     Testosterone Free Direct    6. Benign paroxysmal positional vertigo of right ear    7. Screening for diabetes mellitus    8. Body mass index (BMI) of 38.0 to 38.9 in adult  -     Follicle stimulating hormone  -     Luteinizing hormone  -     Prolactin  -     TSH  -     Testosterone Free Direct  -     Hemoglobin A1c    9. Hirsutism  -     Follicle stimulating hormone  -     Luteinizing hormone  -     Prolactin  -     Von Willebrand panel  -     Ferritin  -     DHEA-sulfate  -     Testosterone Free Direct    10. B12 deficiency  -     Vitamin B12    11. Screening cholesterol level  -     Lipid panel        Assessment & Plan  1. Persistent cough.  - Persistent cough since the end of February, worsening in early March, with improvement but still lingering.  - Sometimes produces greenish sputum.  - A chest x-ray will be conducted today to rule out any underlying conditions.  - If the lungs sound clear and the chest x-ray is normal, continue taking Zyrtec 10 mg and add Flonase nasal spray daily for the next few weeks.    2. Hyperprolactinemia.  - History of elevated prolactin levels, with the last recorded level being 38.7 in 2021.  - No symptoms such as nipple discharge or headaches.  - A blood test will be conducted today to assess current prolactin levels.  - If levels are elevated, an MRI scan of the brain will be recommended to check for any small masses such as prolactinomas.    3. Weight gain.  - Significant weight loss during the COVID-19 pandemic and steady weight gain over the last two years.  - Currently on Lexapro and progesterone-only birth control, both of which can contribute to weight gain.  - Genetic factors may also play a role, as both parents have struggled with weight issues.  -  Basic lab work, including thyroid function tests, will be conducted today to rule out any other potential causes of weight gain.    4. Polycystic ovary syndrome (PCOS).  - History of irregular periods, hirsutism, and multiple follicles on ultrasound, suggesting PCOS.  - Metformin was discussed as a potential treatment option but will be deferred for now pending lab results.  - If lab results indicate PCOS, metformin 500 mg once daily will be considered to help regulate periods, improve fertility, and manage weight.    5. Anxiety and depression.  - Currently on Lexapro, which has been effective in managing anxiety and depression.  - In the process of tapering down the dose from 5 mg to 2.5 mg.  - Will continue to monitor symptoms and adjust the dosage as needed.    6. Dizziness.  - Experiencing dizziness, particularly when turning to the right side, which may be due to benign paroxysmal positional vertigo (BPPV).  - Instructions on how to perform the Epley maneuver will be provided, which can be done at home or with a physical therapist.    7. Health maintenance.  - Received three doses of the HPV vaccine and two doses of the COVID-19 vaccine.  - Basic lab work, including blood count and hormone levels, will be conducted today as part of routine health maintenance.            Follow Up   No follow-ups on file.  Patient was given instructions and counseling regarding her condition or for health maintenance advice. Please see specific information pulled into the AVS if appropriate.           Patient or patient representative verbalized consent for the use of Ambient Listening during the visit with  Jen Dasilva DO for chart documentation. 4/17/2025  13:42 EDT     Attending Only

## 2025-04-19 LAB
BASOPHILS # BLD AUTO: 0.1 X10E3/UL (ref 0–0.2)
BASOPHILS NFR BLD AUTO: 1 %
CHOLEST SERPL-MCNC: 164 MG/DL (ref 100–199)
DHEA-S SERPL-MCNC: 424 UG/DL (ref 84.8–378)
EOSINOPHIL # BLD AUTO: 0.2 X10E3/UL (ref 0–0.4)
EOSINOPHIL NFR BLD AUTO: 2 %
ERYTHROCYTE [DISTWIDTH] IN BLOOD BY AUTOMATED COUNT: 12.7 % (ref 11.7–15.4)
FACT VIII ACT/NOR PPP: 77 % (ref 56–140)
FERRITIN SERPL-MCNC: 62 NG/ML (ref 15–150)
FSH SERPL-ACNC: 6.3 MIU/ML
HBA1C MFR BLD: 5.6 % (ref 4.8–5.6)
HCT VFR BLD AUTO: 45 % (ref 34–46.6)
HDLC SERPL-MCNC: 46 MG/DL
HGB BLD-MCNC: 14.9 G/DL (ref 11.1–15.9)
IMM GRANULOCYTES # BLD AUTO: 0 X10E3/UL (ref 0–0.1)
IMM GRANULOCYTES NFR BLD AUTO: 0 %
LDLC SERPL CALC-MCNC: 105 MG/DL (ref 0–99)
LH SERPL-ACNC: 3.8 MIU/ML
LYMPHOCYTES # BLD AUTO: 2.1 X10E3/UL (ref 0.7–3.1)
LYMPHOCYTES NFR BLD AUTO: 21 %
MCH RBC QN AUTO: 28 PG (ref 26.6–33)
MCHC RBC AUTO-ENTMCNC: 33.1 G/DL (ref 31.5–35.7)
MCV RBC AUTO: 85 FL (ref 79–97)
MONOCYTES # BLD AUTO: 0.8 X10E3/UL (ref 0.1–0.9)
MONOCYTES NFR BLD AUTO: 8 %
NEUTROPHILS # BLD AUTO: 6.9 X10E3/UL (ref 1.4–7)
NEUTROPHILS NFR BLD AUTO: 68 %
PATH INTERP BLD-IMP: NORMAL
PLATELET # BLD AUTO: 356 X10E3/UL (ref 150–450)
PROLACTIN SERPL-MCNC: 29.4 NG/ML (ref 4.8–33.4)
RBC # BLD AUTO: 5.32 X10E6/UL (ref 3.77–5.28)
TESTOST FREE SERPL-MCNC: 2.6 PG/ML (ref 0–4.2)
TRIGL SERPL-MCNC: 68 MG/DL (ref 0–149)
TSH SERPL DL<=0.005 MIU/L-ACNC: 1.48 UIU/ML (ref 0.45–4.5)
VIT B12 SERPL-MCNC: 408 PG/ML (ref 232–1245)
VLDLC SERPL CALC-MCNC: 13 MG/DL (ref 5–40)
VWF AG ACT/NOR PPP IA: 83 % (ref 50–200)
VWF:RCO ACT/NOR PPP PL AGG: 66 % (ref 50–200)
WBC # BLD AUTO: 10.2 X10E3/UL (ref 3.4–10.8)

## 2025-04-23 DIAGNOSIS — R79.89 ELEVATED DHEA: Primary | ICD-10-CM

## 2025-04-25 RX ORDER — METFORMIN HYDROCHLORIDE 500 MG/1
500 TABLET, EXTENDED RELEASE ORAL
Qty: 90 TABLET | Refills: 3 | Status: SHIPPED | OUTPATIENT
Start: 2025-04-25

## 2025-05-21 RX ORDER — DROSPIRENONE 4 MG/1
TABLET, FILM COATED ORAL
Qty: 28 TABLET | Refills: 0 | Status: SHIPPED | OUTPATIENT
Start: 2025-05-21

## 2025-06-25 RX ORDER — DROSPIRENONE 4 MG/1
1 TABLET, FILM COATED ORAL DAILY
Qty: 28 TABLET | Refills: 2 | Status: SHIPPED | OUTPATIENT
Start: 2025-06-25